# Patient Record
Sex: FEMALE | Race: WHITE | Employment: FULL TIME | URBAN - METROPOLITAN AREA
[De-identification: names, ages, dates, MRNs, and addresses within clinical notes are randomized per-mention and may not be internally consistent; named-entity substitution may affect disease eponyms.]

---

## 2019-05-10 ENCOUNTER — HOSPITAL ENCOUNTER (OUTPATIENT)
Age: 24
Discharge: HOME | End: 2019-05-10
Payer: COMMERCIAL

## 2019-05-10 LAB
A/G RATIO: 1.6 (ref 1.1–2.2)
ALBUMIN SERPL-MCNC: 4.5 G/DL (ref 3.4–5)
ALP BLD-CCNC: 52 U/L (ref 40–129)
ALT SERPL-CCNC: 19 U/L (ref 10–40)
ANION GAP SERPL CALCULATED.3IONS-SCNC: 12 MMOL/L (ref 3–16)
AST SERPL-CCNC: 18 U/L (ref 15–37)
BILIRUB SERPL-MCNC: 0.5 MG/DL (ref 0–1)
BUN BLDV-MCNC: 6 MG/DL (ref 7–20)
C-REACTIVE PROTEIN: 1.2 MG/L (ref 0–5.1)
CALCIUM SERPL-MCNC: 9.6 MG/DL (ref 8.3–10.6)
CHLORIDE BLD-SCNC: 105 MMOL/L (ref 99–110)
CHOLESTEROL, FASTING: 143 MG/DL (ref 0–199)
CO2: 25 MMOL/L (ref 21–32)
CREAT SERPL-MCNC: 0.6 MG/DL (ref 0.6–1.1)
FOLATE: 19.13 NG/ML (ref 4.78–24.2)
GFR AFRICAN AMERICAN: >60
GFR NON-AFRICAN AMERICAN: >60
GLOBULIN: 2.9 G/DL
GLUCOSE FASTING: 80 MG/DL (ref 70–99)
HCT VFR BLD CALC: 41.4 % (ref 36–48)
HDLC SERPL-MCNC: 46 MG/DL (ref 40–60)
HEMOGLOBIN: 13.9 G/DL (ref 12–16)
LDL CHOLESTEROL CALCULATED: 77 MG/DL
LITHIUM DOSE AMOUNT: NORMAL
LITHIUM LEVEL: 0.6 MMOL/L (ref 0.6–1.2)
MCH RBC QN AUTO: 29.9 PG (ref 26–34)
MCHC RBC AUTO-ENTMCNC: 33.5 G/DL (ref 31–36)
MCV RBC AUTO: 89.2 FL (ref 80–100)
PDW BLD-RTO: 13.6 % (ref 12.4–15.4)
PLATELET # BLD: 294 K/UL (ref 135–450)
PMV BLD AUTO: 8.5 FL (ref 5–10.5)
POTASSIUM SERPL-SCNC: 4.3 MMOL/L (ref 3.5–5.1)
RBC # BLD: 4.65 M/UL (ref 4–5.2)
SODIUM BLD-SCNC: 142 MMOL/L (ref 136–145)
TOTAL PROTEIN: 7.4 G/DL (ref 6.4–8.2)
TRIGLYCERIDE, FASTING: 100 MG/DL (ref 0–150)
TSH REFLEX: 1.19 UIU/ML (ref 0.27–4.2)
VALPROIC ACID LEVEL: 9.5 UG/ML (ref 50–100)
VITAMIN B-12: 944 PG/ML (ref 211–911)
VITAMIN D 25-HYDROXY: 52.6 NG/ML
VLDLC SERPL CALC-MCNC: 20 MG/DL
WBC # BLD: 4.5 K/UL (ref 4–11)

## 2019-05-10 PROCEDURE — 80061 LIPID PANEL: CPT

## 2019-05-10 PROCEDURE — 82306 VITAMIN D 25 HYDROXY: CPT

## 2019-05-10 PROCEDURE — 86140 C-REACTIVE PROTEIN: CPT

## 2019-05-10 PROCEDURE — 83036 HEMOGLOBIN GLYCOSYLATED A1C: CPT

## 2019-05-10 PROCEDURE — 82746 ASSAY OF FOLIC ACID SERUM: CPT

## 2019-05-10 PROCEDURE — 85027 COMPLETE CBC AUTOMATED: CPT

## 2019-05-10 PROCEDURE — 36415 COLL VENOUS BLD VENIPUNCTURE: CPT

## 2019-05-10 PROCEDURE — 80178 ASSAY OF LITHIUM: CPT

## 2019-05-10 PROCEDURE — 80164 ASSAY DIPROPYLACETIC ACD TOT: CPT

## 2019-05-10 PROCEDURE — 80053 COMPREHEN METABOLIC PANEL: CPT

## 2019-05-10 PROCEDURE — 84443 ASSAY THYROID STIM HORMONE: CPT

## 2019-05-10 PROCEDURE — 82607 VITAMIN B-12: CPT

## 2019-05-11 LAB
ESTIMATED AVERAGE GLUCOSE: 96.8 MG/DL
HBA1C MFR BLD: 5 %

## 2020-01-29 DIAGNOSIS — N25.1 ACQUIRED NEPHROGENIC DIABETES INSIPIDUS (HCC): ICD-10-CM

## 2020-01-29 LAB
ALBUMIN SERPL-MCNC: 4.1 G/DL (ref 3.4–5)
ANION GAP SERPL CALCULATED.3IONS-SCNC: 14 MMOL/L (ref 3–16)
BUN BLDV-MCNC: 12 MG/DL (ref 7–20)
CALCIUM SERPL-MCNC: 9.3 MG/DL (ref 8.3–10.6)
CHLORIDE BLD-SCNC: 101 MMOL/L (ref 99–110)
CO2: 25 MMOL/L (ref 21–32)
CORTISOL TOTAL: 19.8 UG/DL
CREAT SERPL-MCNC: 0.5 MG/DL (ref 0.6–1.1)
GFR AFRICAN AMERICAN: >60
GFR NON-AFRICAN AMERICAN: >60
GLUCOSE BLD-MCNC: 74 MG/DL (ref 70–99)
OSMOLALITY URINE: 664 MOSM/KG (ref 390–1070)
PHOSPHORUS: 3.8 MG/DL (ref 2.5–4.9)
POTASSIUM SERPL-SCNC: 4.6 MMOL/L (ref 3.5–5.1)
SODIUM BLD-SCNC: 140 MMOL/L (ref 136–145)
SODIUM URINE: 40 MMOL/L

## 2023-03-16 LAB
ALANINE AMINOTRANSFERASE (SGPT) (U/L) IN SER/PLAS: 13 U/L (ref 7–45)
ANION GAP IN SER/PLAS: 11 MMOL/L (ref 10–20)
CALCIUM (MG/DL) IN SER/PLAS: 9 MG/DL (ref 8.6–10.3)
CARBON DIOXIDE, TOTAL (MMOL/L) IN SER/PLAS: 29 MMOL/L (ref 21–32)
CHLORIDE (MMOL/L) IN SER/PLAS: 104 MMOL/L (ref 98–107)
CHOLESTEROL (MG/DL) IN SER/PLAS: 149 MG/DL (ref 0–199)
CHOLESTEROL IN HDL (MG/DL) IN SER/PLAS: 43.3 MG/DL
CHOLESTEROL/HDL RATIO: 3.4
CREATININE (MG/DL) IN SER/PLAS: 0.69 MG/DL (ref 0.5–1.05)
ERYTHROCYTE DISTRIBUTION WIDTH (RATIO) BY AUTOMATED COUNT: 13.4 % (ref 11.5–14.5)
ERYTHROCYTE MEAN CORPUSCULAR HEMOGLOBIN CONCENTRATION (G/DL) BY AUTOMATED: 31.9 G/DL (ref 32–36)
ERYTHROCYTE MEAN CORPUSCULAR VOLUME (FL) BY AUTOMATED COUNT: 86 FL (ref 80–100)
ERYTHROCYTES (10*6/UL) IN BLOOD BY AUTOMATED COUNT: 4.18 X10E12/L (ref 4–5.2)
GFR FEMALE: >90 ML/MIN/1.73M2
GLUCOSE (MG/DL) IN SER/PLAS: 81 MG/DL (ref 74–99)
HEMATOCRIT (%) IN BLOOD BY AUTOMATED COUNT: 36.1 % (ref 36–46)
HEMOGLOBIN (G/DL) IN BLOOD: 11.5 G/DL (ref 12–16)
HEMOGLOBIN A1C/HEMOGLOBIN TOTAL IN BLOOD: 4.7 %
LDL: 78 MG/DL (ref 0–99)
LEUKOCYTES (10*3/UL) IN BLOOD BY AUTOMATED COUNT: 5.9 X10E9/L (ref 4.4–11.3)
PLATELETS (10*3/UL) IN BLOOD AUTOMATED COUNT: 380 X10E9/L (ref 150–450)
POTASSIUM (MMOL/L) IN SER/PLAS: 4.1 MMOL/L (ref 3.5–5.3)
SODIUM (MMOL/L) IN SER/PLAS: 140 MMOL/L (ref 136–145)
THYROTROPIN (MIU/L) IN SER/PLAS BY DETECTION LIMIT <= 0.05 MIU/L: 1.5 MIU/L (ref 0.44–3.98)
TRIGLYCERIDE (MG/DL) IN SER/PLAS: 140 MG/DL (ref 0–149)
UREA NITROGEN (MG/DL) IN SER/PLAS: 11 MG/DL (ref 6–23)
VALPROIC ACID (UG/ML) IN SER/PLAS: <10 UG/ML (ref 50–100)
VLDL: 28 MG/DL (ref 0–40)

## 2023-03-17 ENCOUNTER — TELEPHONE (OUTPATIENT)
Dept: PRIMARY CARE | Facility: CLINIC | Age: 28
End: 2023-03-17
Payer: COMMERCIAL

## 2023-03-17 DIAGNOSIS — D64.9 ANEMIA, UNSPECIFIED TYPE: ICD-10-CM

## 2023-03-17 DIAGNOSIS — E03.9 HYPOTHYROIDISM, UNSPECIFIED TYPE: ICD-10-CM

## 2023-03-17 NOTE — TELEPHONE ENCOUNTER
----- Message from Smiley Amador MD sent at 3/16/2023  5:58 PM EDT -----  Notify overall looks good.  Mild anemia.  Repeat CBC with differential, ferritin, iron panel, B12, folate, TSH in the next few weeks.  No fasting needed.

## 2023-10-02 ENCOUNTER — TELEPHONE (OUTPATIENT)
Dept: PRIMARY CARE | Facility: CLINIC | Age: 28
End: 2023-10-02
Payer: COMMERCIAL

## 2023-10-02 NOTE — TELEPHONE ENCOUNTER
Pt's mom, Courtney, left a msg stating that Carli told her that she has hypothyroidism, and that there was suppose to be a referral to Endocrinology. I don't see a referral.  Is there  suppose to be one?

## 2023-10-05 ENCOUNTER — OFFICE VISIT (OUTPATIENT)
Dept: BEHAVIORAL HEALTH | Facility: CLINIC | Age: 28
End: 2023-10-05
Payer: COMMERCIAL

## 2023-10-05 VITALS
SYSTOLIC BLOOD PRESSURE: 126 MMHG | BODY MASS INDEX: 29.47 KG/M2 | DIASTOLIC BLOOD PRESSURE: 83 MMHG | HEIGHT: 69 IN | WEIGHT: 199 LBS | HEART RATE: 69 BPM

## 2023-10-05 DIAGNOSIS — F41.9 ANXIETY: ICD-10-CM

## 2023-10-05 DIAGNOSIS — F31.81 BIPOLAR 2 DISORDER (MULTI): ICD-10-CM

## 2023-10-05 PROCEDURE — 99214 OFFICE O/P EST MOD 30 MIN: CPT

## 2023-10-05 RX ORDER — GABAPENTIN 100 MG/1
100 CAPSULE ORAL 3 TIMES DAILY
COMMUNITY
End: 2023-10-07 | Stop reason: SDUPTHER

## 2023-10-05 RX ORDER — LORAZEPAM 0.5 MG/1
0.5 TABLET ORAL DAILY PRN
COMMUNITY
End: 2023-10-05 | Stop reason: SDUPTHER

## 2023-10-05 RX ORDER — LURASIDONE HYDROCHLORIDE 40 MG/1
40 TABLET, FILM COATED ORAL
COMMUNITY
End: 2023-11-02 | Stop reason: SDUPTHER

## 2023-10-05 RX ORDER — LAMOTRIGINE 25 MG/1
50 TABLET ORAL DAILY
COMMUNITY
Start: 2023-08-07 | End: 2023-11-02 | Stop reason: SDUPTHER

## 2023-10-05 RX ORDER — LAMOTRIGINE 100 MG/1
200 TABLET ORAL DAILY
COMMUNITY
Start: 2017-02-15 | End: 2023-11-02 | Stop reason: SDUPTHER

## 2023-10-05 RX ORDER — SERTRALINE HYDROCHLORIDE 100 MG/1
200 TABLET, FILM COATED ORAL DAILY
COMMUNITY
End: 2023-11-02 | Stop reason: ALTCHOICE

## 2023-10-05 RX ORDER — LORAZEPAM 0.5 MG/1
0.5 TABLET ORAL DAILY PRN
Qty: 14 TABLET | Refills: 0 | Status: SHIPPED | OUTPATIENT
Start: 2023-10-05 | End: 2023-11-02 | Stop reason: SDUPTHER

## 2023-10-05 RX ORDER — LEVOTHYROXINE SODIUM 125 UG/1
125 TABLET ORAL DAILY
COMMUNITY
Start: 2017-02-15

## 2023-10-05 RX ORDER — MIRTAZAPINE 15 MG/1
15 TABLET, FILM COATED ORAL NIGHTLY
Qty: 30 TABLET | Refills: 11 | Status: SHIPPED | OUTPATIENT
Start: 2023-10-05 | End: 2023-11-02 | Stop reason: ALTCHOICE

## 2023-10-05 NOTE — TELEPHONE ENCOUNTER
I spoke with Courtney, pt's mom, and she said she thought that Carli had told her there was an outstanding referral to Endocrinology.  I relayed Dr. Amador's msg that she needs to do the blood work that was entered in the Spring.  She said she will have Carli to it soon.
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
## 2023-10-06 ENCOUNTER — OFFICE VISIT (OUTPATIENT)
Dept: BEHAVIORAL HEALTH | Facility: CLINIC | Age: 28
End: 2023-10-06
Payer: COMMERCIAL

## 2023-10-06 DIAGNOSIS — F31.81 BIPOLAR 2 DISORDER (MULTI): ICD-10-CM

## 2023-10-06 DIAGNOSIS — F41.9 ANXIETY: ICD-10-CM

## 2023-10-06 PROCEDURE — 90837 PSYTX W PT 60 MINUTES: CPT | Performed by: PSYCHOLOGIST

## 2023-10-06 NOTE — PROGRESS NOTES
"4 PM 66219 Indiv Psych for BP II and Anxiety (60 MIN, [phone])  In-person. Continues to be anxious. Starting remeron, hoping that helps with mood and anxiety. Trying to schedule with an allergist, breaking out more, also with an endocrinologist. Wedding tomorrow. Didn't go to work yesterday, worked from home. Boss giving permission to work from homes at times if has seen patients she needs to. Continuing to work on chipping into avoidance. Didn't get to running group. Fearful at night, bad associations to when on seroquel. Trying to take \"layers/baby steps.\" Fearful of cat, Tiger (7) dying. Discussed ways to reframe this. Past week, \"restlessness.\" Briefly discussed DBT which I am not trained in. Will send her some resources. Next: 2 weeks.   "

## 2023-10-06 NOTE — TELEPHONE ENCOUNTER
I spoke again with the pt's mom, Courtney, and let her know that there was no referral to endocrinology in either the EMR or EPIC.  I spoke with the pt's pharmacy and the pt is receiving Levo 125 mcg from her psychiatrist, Dr. Cindy Gonzales.

## 2023-10-07 RX ORDER — GABAPENTIN 100 MG/1
200 CAPSULE ORAL 3 TIMES DAILY
Qty: 180 CAPSULE | Refills: 0 | Status: SHIPPED | OUTPATIENT
Start: 2023-10-07 | End: 2023-11-02 | Stop reason: SDUPTHER

## 2023-10-08 NOTE — PATIENT INSTRUCTIONS
Start Remeron 15mg at bedtime  Continue Gabapetnin, Latuda, Zoloft, lamoatrigine and ativan as prescribed   Follow up visit in 3 weeks or sooner if needed  Please call the office of message via [employer] with any questions or concerns.

## 2023-10-20 ENCOUNTER — LAB (OUTPATIENT)
Dept: LAB | Facility: LAB | Age: 28
End: 2023-10-20
Payer: COMMERCIAL

## 2023-10-20 ENCOUNTER — OFFICE VISIT (OUTPATIENT)
Dept: BEHAVIORAL HEALTH | Facility: CLINIC | Age: 28
End: 2023-10-20
Payer: COMMERCIAL

## 2023-10-20 DIAGNOSIS — E03.9 HYPOTHYROIDISM, UNSPECIFIED TYPE: ICD-10-CM

## 2023-10-20 DIAGNOSIS — F41.9 ANXIETY: ICD-10-CM

## 2023-10-20 DIAGNOSIS — D64.9 ANEMIA, UNSPECIFIED TYPE: ICD-10-CM

## 2023-10-20 DIAGNOSIS — F31.81 BIPOLAR 2 DISORDER (MULTI): ICD-10-CM

## 2023-10-20 LAB
BASOPHILS # BLD AUTO: 0.06 X10*3/UL (ref 0–0.1)
BASOPHILS NFR BLD AUTO: 1 %
EOSINOPHIL # BLD AUTO: 0.09 X10*3/UL (ref 0–0.7)
EOSINOPHIL NFR BLD AUTO: 1.5 %
ERYTHROCYTE [DISTWIDTH] IN BLOOD BY AUTOMATED COUNT: 14 % (ref 11.5–14.5)
FERRITIN SERPL-MCNC: 22 NG/ML (ref 8–150)
FOLATE SERPL-MCNC: 8.5 NG/ML
HCT VFR BLD AUTO: 38.7 % (ref 36–46)
HGB BLD-MCNC: 12.6 G/DL (ref 12–16)
IMM GRANULOCYTES # BLD AUTO: 0.02 X10*3/UL (ref 0–0.7)
IMM GRANULOCYTES NFR BLD AUTO: 0.3 % (ref 0–0.9)
IRON SATN MFR SERPL: 18 % (ref 25–45)
IRON SERPL-MCNC: 87 UG/DL (ref 35–150)
LYMPHOCYTES # BLD AUTO: 2.39 X10*3/UL (ref 1.2–4.8)
LYMPHOCYTES NFR BLD AUTO: 39 %
MCH RBC QN AUTO: 27.5 PG (ref 26–34)
MCHC RBC AUTO-ENTMCNC: 32.6 G/DL (ref 32–36)
MCV RBC AUTO: 84 FL (ref 80–100)
MONOCYTES # BLD AUTO: 0.53 X10*3/UL (ref 0.1–1)
MONOCYTES NFR BLD AUTO: 8.6 %
NEUTROPHILS # BLD AUTO: 3.04 X10*3/UL (ref 1.2–7.7)
NEUTROPHILS NFR BLD AUTO: 49.6 %
NRBC BLD-RTO: 0 /100 WBCS (ref 0–0)
PLATELET # BLD AUTO: 362 X10*3/UL (ref 150–450)
PMV BLD AUTO: 10 FL (ref 7.5–11.5)
RBC # BLD AUTO: 4.59 X10*6/UL (ref 4–5.2)
TIBC SERPL-MCNC: 488 UG/DL (ref 240–445)
TSH SERPL-ACNC: 3.65 MIU/L (ref 0.44–3.98)
UIBC SERPL-MCNC: 401 UG/DL (ref 110–370)
VIT B12 SERPL-MCNC: 366 PG/ML (ref 211–911)
WBC # BLD AUTO: 6.1 X10*3/UL (ref 4.4–11.3)

## 2023-10-20 PROCEDURE — 36415 COLL VENOUS BLD VENIPUNCTURE: CPT

## 2023-10-20 PROCEDURE — 82728 ASSAY OF FERRITIN: CPT

## 2023-10-20 PROCEDURE — 85025 COMPLETE CBC W/AUTO DIFF WBC: CPT

## 2023-10-20 PROCEDURE — 83550 IRON BINDING TEST: CPT

## 2023-10-20 PROCEDURE — 84443 ASSAY THYROID STIM HORMONE: CPT

## 2023-10-20 PROCEDURE — 83540 ASSAY OF IRON: CPT

## 2023-10-20 PROCEDURE — 82746 ASSAY OF FOLIC ACID SERUM: CPT

## 2023-10-20 PROCEDURE — 90837 PSYTX W PT 60 MINUTES: CPT | Performed by: PSYCHOLOGIST

## 2023-10-20 PROCEDURE — 82607 VITAMIN B-12: CPT

## 2023-10-20 NOTE — PROGRESS NOTES
4 PM 01979 Indiv Psych for BP II and Anxiety (60 MIN, [phone])  In-person. Overall anxiety improved but still reported consistent. Had surgery to remove some places on back, recovering, broke stitches. Hasn't been able to exercise as a result. Ate something, concerned with itching, possible allergic reaction, called 911 who came out but said to just monitor, attributing it to her anxiety. Remeron has been sedating, on the past three weeks, sleeping more (11-12 hours at night and napping). Will discuss with pharmacologist next month. Still using ativan sparingly, still daily cannabis. Said she took ativan (0.5 mg) a few times over the past week. Looking forward to getting back to exercise, likely get to [employer] more. Trip to [facility] with mom and dad Oct 26-29, visiting grandmother. Less worry about cat. Had trunk or treat event at work, enjoyed, another one at another facility next week. Hasn't gotten to running group. Feeling restless at [employer] meeting, went out and took a break. Next: 2 weeks.

## 2023-11-02 ENCOUNTER — OFFICE VISIT (OUTPATIENT)
Dept: BEHAVIORAL HEALTH | Facility: CLINIC | Age: 28
End: 2023-11-02
Payer: COMMERCIAL

## 2023-11-02 VITALS
BODY MASS INDEX: 29.47 KG/M2 | HEART RATE: 73 BPM | DIASTOLIC BLOOD PRESSURE: 94 MMHG | WEIGHT: 199 LBS | HEIGHT: 69 IN | SYSTOLIC BLOOD PRESSURE: 147 MMHG

## 2023-11-02 DIAGNOSIS — F31.30 BIPOLAR AFFECTIVE DISORDER, CURRENT EPISODE DEPRESSED, CURRENT EPISODE SEVERITY UNSPECIFIED (MULTI): ICD-10-CM

## 2023-11-02 DIAGNOSIS — F41.9 ANXIETY: ICD-10-CM

## 2023-11-02 DIAGNOSIS — F31.81 BIPOLAR 2 DISORDER (MULTI): ICD-10-CM

## 2023-11-02 DIAGNOSIS — F41.0 PANIC DISORDER WITHOUT AGORAPHOBIA WITH SEVERE PANIC ATTACKS: ICD-10-CM

## 2023-11-02 DIAGNOSIS — F12.20 CANNABIS USE DISORDER, MODERATE, DEPENDENCE (MULTI): ICD-10-CM

## 2023-11-02 DIAGNOSIS — F32.A DEPRESSION, UNSPECIFIED DEPRESSION TYPE: ICD-10-CM

## 2023-11-02 PROCEDURE — 99215 OFFICE O/P EST HI 40 MIN: CPT

## 2023-11-02 RX ORDER — LAMOTRIGINE 25 MG/1
50 TABLET ORAL DAILY
Qty: 60 TABLET | Refills: 1 | Status: SHIPPED | OUTPATIENT
Start: 2023-11-02 | End: 2023-12-22 | Stop reason: SDUPTHER

## 2023-11-02 RX ORDER — LAMOTRIGINE 100 MG/1
200 TABLET ORAL DAILY
Qty: 60 TABLET | Refills: 1 | Status: SHIPPED | OUTPATIENT
Start: 2023-11-02 | End: 2023-12-22 | Stop reason: SDUPTHER

## 2023-11-02 RX ORDER — GABAPENTIN 100 MG/1
200 CAPSULE ORAL 3 TIMES DAILY
Qty: 180 CAPSULE | Refills: 1 | Status: SHIPPED | OUTPATIENT
Start: 2023-11-02 | End: 2023-12-01 | Stop reason: ALTCHOICE

## 2023-11-02 RX ORDER — DULOXETIN HYDROCHLORIDE 20 MG/1
20 CAPSULE, DELAYED RELEASE ORAL SEE ADMIN INSTRUCTIONS
Qty: 60 CAPSULE | Refills: 1 | Status: SHIPPED | OUTPATIENT
Start: 2023-11-02 | End: 2023-12-22 | Stop reason: SDUPTHER

## 2023-11-02 RX ORDER — LORAZEPAM 0.5 MG/1
0.5 TABLET ORAL 2 TIMES DAILY PRN
Qty: 60 TABLET | Refills: 0 | Status: SHIPPED | OUTPATIENT
Start: 2023-11-02 | End: 2023-12-01 | Stop reason: SDUPTHER

## 2023-11-02 RX ORDER — LURASIDONE HYDROCHLORIDE 40 MG/1
40 TABLET, FILM COATED ORAL
Qty: 30 TABLET | Refills: 1 | Status: SHIPPED | OUTPATIENT
Start: 2023-11-02 | End: 2023-12-01 | Stop reason: ALTCHOICE
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
## 2023-11-03 ENCOUNTER — OFFICE VISIT (OUTPATIENT)
Dept: BEHAVIORAL HEALTH | Facility: CLINIC | Age: 28
End: 2023-11-03
Payer: COMMERCIAL

## 2023-11-03 DIAGNOSIS — F41.9 ANXIETY: ICD-10-CM

## 2023-11-03 DIAGNOSIS — F31.81: ICD-10-CM

## 2023-11-03 PROCEDURE — 90837 PSYTX W PT 60 MINUTES: CPT | Performed by: PSYCHOLOGIST

## 2023-11-03 NOTE — PATIENT INSTRUCTIONS
STOP Remeron   Continue Latuda, Lamotrgine and gabapentin without change   Taper off Zoloft and onto Duloxetine - instructions emailed to you directly but please ask if you have questions   Increase ativan in the short term to 0.5mg twice per day as needed for anxiety  Follow up visit in 3 weeks or sooner if needed  Please call the office or message via [phone] with any questions

## 2023-11-03 NOTE — PROGRESS NOTES
"4 PM 35219 Indiv Psych for BP II, Depressed and Anxiety (60 MIN, [phone])  In-person. Supportive/CBT Therapy. More depressed over past few weeks, low energy, \"no desire to walk\". Cut one day (hip) with . Unclear precipitant, biological, less misha days, med changes (now stopping mirtazapine, will start duloxetine in 2 weeks, will titrate off sertraline over a month, on new bc pill/hormonal). 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 Has decided to back off of [employer] activity for a few weeks, may only go to part of Nov 17 retreat at [facility]. Working from home some. Wants to focus on behavioral activation and plans on making list of DBT skills to try to adhere to. REECE, an OB in training, will be coming for several weeks from [place] to stay with her. Went to [facility] a few weeks ago and enjoyed.  \"I don't want to leave home. Lost interest in everything...Non-functional.\" Identified some activities she wants to prioritize (walk, blinds for spare bedroom for when REECE comes, oil change, groceries--may have delivered). Is concerned there aren't a lot of good med interventions left. Signed up for ketamine tx's if meds don't help. Next: 2 weeks.   "

## 2023-11-07 ENCOUNTER — DOCUMENTATION (OUTPATIENT)
Dept: BEHAVIORAL HEALTH | Facility: CLINIC | Age: 28
End: 2023-11-07
Payer: COMMERCIAL

## 2023-11-07 NOTE — PROGRESS NOTES
"Received call back request from patient. Last visit on 11/2/23.  Outreach attempted at 5:03pm - patient reports feeling \"not great\".   Since last visit patient has reduced Zoloft dose to 150mg daily - worsening mood, appetite, and motivation reported. She communicates: \"Gotten significantly worse\".   She is tearful as she describes not wanting to stop plan to taper off Zoloft but also does recognize a significant negative change in her symptoms over the past few days since adjusting. Ultimately we decided together to maintain Zoloft at 150mg and check in 24-48 hours before finalizing plan which patient is agreeable to. Previously discussed DBT based IOP at [facility] which patient agrees to look into - phone number provided. Patients sister lives nearby and is part of her support system. Patient reports feeling safe at this time. + passive SI or thoughts of Death. No Plan or intent. No SIB. Patient agrees to call 911 or go to the ER if SI w/ plan or intent is recognized.     "

## 2023-11-08 ENCOUNTER — APPOINTMENT (OUTPATIENT)
Dept: ALLERGY | Facility: CLINIC | Age: 28
End: 2023-11-08
Payer: COMMERCIAL

## 2023-11-09 ENCOUNTER — DOCUMENTATION (OUTPATIENT)
Dept: BEHAVIORAL HEALTH | Facility: CLINIC | Age: 28
End: 2023-11-09
Payer: COMMERCIAL

## 2023-11-09 NOTE — PROGRESS NOTES
"Outreach attempted at 5:21pm   Spoke to patient - anxiety and mood symptoms remain present but improving: \"difficult but manageable\". She has signed up from [facility] starting next week. Will complete intake with CNP x1 with tentative plan to continue SSRI taper.   "

## 2023-11-10 ENCOUNTER — OFFICE VISIT (OUTPATIENT)
Dept: BEHAVIORAL HEALTH | Facility: CLINIC | Age: 28
End: 2023-11-10
Payer: COMMERCIAL

## 2023-11-10 DIAGNOSIS — F31.81: ICD-10-CM

## 2023-11-10 PROCEDURE — 90837 PSYTX W PT 60 MINUTES: CPT | Performed by: PSYCHOLOGIST

## 2023-11-11 NOTE — PROGRESS NOTES
5 PM 37069 Indiv Psych for BP II and Anxiety (60 MIN, [phone])  In-person. Supportive/CBT Therapy. Continues to be distressed, depressed. Teary during meeting. Believes some of distress may be secondary to sertraline taper/withdrawal. However, was feeling more depressed prior to taper. She's signed up for  General night IOP program, M, Wed, Thur evenings. So will plan to still do work then go there. Will make for long days but has more difficulty managing at night. Reported no new incidents of self-harm but did report she's felt like it, noted cannabis use has helped, also builds in delay. Still taking ativan. 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 Bothered that head of youth ministry didn't inquire about how she was doing or taking off, speculated perhaps he's trying to respect her privacy. Asked REECE re. Possibly hormonal influence on current mood state and may do ovulation strips to assess hormone levels. Has been feeling more nauseous, not like eating and has to push self. Sees restriction as possible means of self-harm. Said she wanted to lose weight. Discussed importance of keeping energy/calories in system, to help reduce distress. Said she knows she can get by with around 900 mike/day. Asked best friend who is a GI PA about GI distress. I also encouraged her to consider having a medical workup. Trying to get outside, gets some walks in. REECE coming tomorrow but will stay with parents. She's invited to go there for dinner. Family will come see her tomorrow. She has to work early in AM for second job. Has a rough plan for this evening. Will video chat with a friend who recently had a baby, also may talk to cousin. Body hurts, may get a shower. 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 Did get another office at one facility, likes [facility] mate. Agreed to meet in a week to check in. Patient continues to be concerned what she'll do if med options run out. Season, darker outside, may also be  influence to increase in depression. Doesn't have light box, may get one. Next: 1 week.

## 2023-11-13 ENCOUNTER — TELEPHONE (OUTPATIENT)
Dept: PRIMARY CARE | Facility: CLINIC | Age: 28
End: 2023-11-13
Payer: COMMERCIAL

## 2023-11-13 NOTE — TELEPHONE ENCOUNTER
Pt's mom, Courtney, left a msg stating that the pt had thyroid blood work done and was looking for the results.  She wanted you to know that she had been off her meds for a week prior to the blood draw.  She will need a refill of the medication with changes if any.

## 2023-11-17 ENCOUNTER — TELEMEDICINE (OUTPATIENT)
Dept: BEHAVIORAL HEALTH | Facility: CLINIC | Age: 28
End: 2023-11-17
Payer: COMMERCIAL

## 2023-11-17 DIAGNOSIS — F31.81: ICD-10-CM

## 2023-11-17 PROCEDURE — 90837 PSYTX W PT 60 MINUTES: CPT | Performed by: PSYCHOLOGIST

## 2023-11-17 NOTE — PROGRESS NOTES
"4 PM 93098 Indiv Psych for BP II and Anxiety (60 MIN, [id number])  In-person. Supportive Therapy. Started IOP at [facility], three times this week. Difficult first day, found discussion on setting boundaries useful. 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 Still crying a lot at work/home, going home early from work and working rest of day from home. Hopeless ideation, no suicide plans, just feeling hopeless, concerned about running out of treatment options. Wants to discuss with pharmacologist coming off everything except duloxetine. Has already discontinued gabapentin. She mentioned her uncle, who's a psychiatrist, said a \"CA rocket fuel\" of mirtazapine then venlafaxine might help. Will discuss with pharmacologist. Reported that cannabis used routinely brings down distress level, from 9 to a 5. Still using ativan 2 times/day. Not exercising routinely, did go out and run 3.5 miles the other day, felt good. Discussed whether or not doing less more consistently would be better than more (3.5 mi) less often. Not eating a lot, low appetite, lost 5 pounds in past few weeks. Sleep problems, using melatonin now. Busy weekend, cousin's birthday celebration at [employer], dinner, Thanksgiving coming up. Will continue with IOP, may only get there one day next week. Next: 3 weeks.     "

## 2023-11-21 DIAGNOSIS — E03.9 HYPOTHYROIDISM, UNSPECIFIED TYPE: Primary | ICD-10-CM

## 2023-11-22 ENCOUNTER — TELEMEDICINE (OUTPATIENT)
Dept: BEHAVIORAL HEALTH | Facility: CLINIC | Age: 28
End: 2023-11-22
Payer: COMMERCIAL

## 2023-11-22 DIAGNOSIS — F41.0 PANIC DISORDER WITHOUT AGORAPHOBIA WITH SEVERE PANIC ATTACKS: ICD-10-CM

## 2023-11-22 DIAGNOSIS — F12.20 CANNABIS USE DISORDER, MODERATE, DEPENDENCE (MULTI): ICD-10-CM

## 2023-11-22 DIAGNOSIS — F41.9 ANXIETY: ICD-10-CM

## 2023-11-22 DIAGNOSIS — F31.30 BIPOLAR AFFECTIVE DISORDER, CURRENT EPISODE DEPRESSED, CURRENT EPISODE SEVERITY UNSPECIFIED (MULTI): ICD-10-CM

## 2023-11-22 PROCEDURE — 99215 OFFICE O/P EST HI 40 MIN: CPT

## 2023-11-22 RX ORDER — LURASIDONE HYDROCHLORIDE 60 MG/1
60 TABLET, FILM COATED ORAL DAILY
Qty: 30 TABLET | Refills: 0 | Status: SHIPPED | OUTPATIENT
Start: 2023-11-22 | End: 2023-12-01 | Stop reason: SDUPTHER

## 2023-11-22 RX ORDER — DOXYCYCLINE 100 MG/1
CAPSULE ORAL
COMMUNITY
Start: 2023-11-15

## 2023-11-22 RX ORDER — MUPIROCIN 20 MG/G
OINTMENT TOPICAL
COMMUNITY
Start: 2023-11-16

## 2023-11-22 RX ORDER — TRIAMCINOLONE ACETONIDE 1 MG/G
CREAM TOPICAL
COMMUNITY
Start: 2023-06-30

## 2023-11-22 RX ORDER — CLINDAMYCIN PHOSPHATE 10 MG/ML
SOLUTION TOPICAL 2 TIMES DAILY
COMMUNITY
Start: 2023-11-15

## 2023-11-22 NOTE — PROGRESS NOTES
"Subjective   Patient ID: Carli Wang is a 28 y.o. female who presents for Follow-up Last visit with this writer on 11/2/23     HPI  Patient has entered [facility] which is going well -\"I enjoy it\". Patient is tearful and distraught on camera as she reports intensifying symptoms of depression and persistent symptoms of anxiety. During previous visits anxiety and atypical panic attacks presented as priority concern but today patient communicates depression represents her priority of treatment. She communicates \"I feel worse and worse, pretty badly every day\". Mood is depressed with daily crying spells, anhedonia, passive SI, thoughts of SIB - functioning is significantly impacted as patient reports often working from home and leaving early d/t depressive symptoms. Discussed stopping Zoloft taper - patient feels mood change started prior to adjustment in Zoloft dosing and wishes to continue taper. Brain zaps reported during first week of taper but no withdrawal symptoms reported now - taking duloxetine for 1 week at this time, stopped Zoloft today. Discussed timeline of mood change and patient describes a noticeable change in her mood 6-8 weeks ago - no new stressors or events occurring then that patient can connect with mood change.     -- Depression --   Mood: \"hopeless\", \"not functional\"  + anhedonia    Daily snacking - no consistent meals   + 6 lbs weight loss over the past 3 weeks   + nausea   Sleep: inadequate, + restless sleep   - no nightmares  + nighttime panic attacks   Energy levels: + fatigue   Focus/concentration: impaired  Hopelessness: endorses     Patient reports thoughts of SIB but has avoided consistently since last visit.   + passive SI reported, no plan or intent. Patient is confident she can continue to keep herself safe and is accepting of safety resources. Discussed voluntary admission - patient differs and does not meet criteria for involuntary admission. Patient does agree to " "continue relying on her support system and agrees to call 911 or reporting to the ER if SI w/ a plan or intent was recognized.     Bipolar ROS   Patient does report ongoing restlessness - discussed risk of akathisia which after review of symptom frequency and timeline does not appear applicable.   1. Inflated self-esteem or grandiosity - denies   2. Decreased need for sleep - Denies   3. More talkative than usual or pressure to keep talking - denies   4. Flight of ideas or subjective experience that thoughts are racing - endorses   5. Distractibility - denies   6. Increase in goal-directed activity - denies   7. Excessive involvement in activities that have a high potential for  painful consequences - denies     AVH - no   Fear/paranoia - no   No delusional thinking noted  Does not appear internally stimulated     - anxiety -   Panic symptoms - \"100% of the time\", \"sense of panic and hopelessness\"   Restlessness/keyed up or on edge: restlessness   Easily fatigued: endorses   Concentration/Focus: endorses   Irritability: endorses   Muscle tension: \"everything\", + GI upset. \"Body aches\"   Sleep disturbance: endorses   + panic symptoms but no discrete panic attacks noted     OCD  + intrusive thoughts \"all the time\" r/t death (not purely SI)   No checking behaviors, previously reported  Improving intrusive thoughts related to safety at home.     No skin picking/hair pulling     PTSD   - \"I don't know why I have a PTSD diagnosis\"   - \"slightly traumatic relationship issue\" before being admitted   - sometimes thinking about now   - No flashbacks/hypervigilance   + hx of PTSD diagnosed by former provider     Substance use   Alcohol: No alcohol use since last visit.   Denies tobacco use   Caffeine: (1-2 cups of coffee per day)   Marijuana:  (Smoking legal MJ delta 8 - \"pretty consistently since age 19\". Daily use   No illicit drug use reported   No hx of treatment for JESSICA     Safety:   Patient is moderate acute and moderate " "chronic risk of suicide. Static risk factors include female gender,  race and age 27. Dynamic risk factors include above psychiatric symptoms which we are addressing with medication. Protective factors include no previous attempts, rational thinking intact, good social support, help seeking, future oriented and no guns at home.    Objective   General Appearance: Well groomed, appropriate eye contact  Attitude/Behavior: Cooperative  Motor: No psychomotor agitation or retardation, no tremor or other abnormal movements  Speech: Normal rate, volume, prosody  Gait/Station: WFL - Within functional limits  Mood: \"hopeless\", \"not functional\"  Affect: Dysphoric, constricted but reactive, Sad/Tearful, Anxious  Thought Process: Linear, goal directed  Thought Associations: No loosening of associations  Thought Content: Normal  Perception: No perceptual abnormalities noted  Sensorium: Alert and oriented to person, place, time and situation  Insight: Fair  Judgement: Fair  Cognition: Cognitively intact to conversational testing with respect to attention, orientation, fund of knowledge, recent and remote memory, and language    Lab Review:   not applicable    Assessment/Plan   Problem List Items Addressed This Visit             ICD-10-CM    Anxiety F41.9    Bipolar affective disorder, current episode depressed (CMS/[facility]) F31.30    Relevant Medications    lurasidone (Latuda) 60 mg tablet    Cannabis use disorder, moderate, dependence (CMS/HCC) F12.20    Panic disorder without agoraphobia with severe panic attacks F41.0   Provider impression/Recommendations  Carli Wang is a 27 year old female currently residing in [place], [place] with herself. She is employed full-time as a dietician traveling to local nursing homes. She is being seen as a referral from Dr. Magdaleno Starks at  - last/initial appointment on 2/27/23. Pphx: Bipolar type 2, OCD, anxiety w/ panic attacks, PTSD, Trichotillomania. Sought with multiple " different providers in [facility] starting in childhood. Previously treated by Dr. Mao in [place] for the past 6 years.       h/o OCD  h/o PTSD   H/o Trichotillomania   bipolar and related disorder, unspecified   Panic disorder    ( R/o cluster B personality traits)   cannabis use d/o     current medications   gabapentin 200mg TID - unsure if helpful (patient self discontinued)   lorazepam 0.5mg BID - taking BID daily   Reviewed OARRS on 11/22/23 - Ativan last filled on 11/2/23 for 60 tablets  Drug screen completed on 4/13/23 (during ER visit), + THC   Latuda 40mg daily   lamotrigine 250mg daily  Duloxetine 20mg daily    - Mood is depressed/anxious with increasing anxiety and depressive symptoms   - no new stressors or specific stressors reported that appear to impact current presentation  - Functioning continues to decline with passive SI, intrusive thoughts related to death and thoughts of SIB.   - This writer is concerned regarding patients safety and overall presentation. Patient denies voluntary admission and does not meet criteria for involuntary admission   - Provided safety resources and gained collateral from patients sister who lives nearby   - Patient does report Ativan is one of few agents that has consistently benefited her anxiety  - Patient does report using MJ products but given her recent decline in functioning will continue benzodiazepine at current dose.   - increase Latuda dose to 60mg daily (discussed importance of taking with food)    - c/w LTG 250mg daily   - c/w Duloxetine 20mg daily   - c/w Ativan 0.5mg BID   - encouraged patient to continue in IOP at [facility]   - Patient wishes to consider Ketamine/TMS, referral placed for patient to see Dr. Blackwell as second opinion and to explore TMS/Ketamine. Case discussed and confirmed with Dr. Blackwell    - Continue Duloxetine per pre-set taper. Discussed Zyprexa as option to consider if no benefits noted at higher dose of Latuda.   - FU visit in 1 week or  "sooner if needed  - reviewed medication supply with patient and placed refills as needed.       Past Medication Trials:   \"trialed every single anti-depressants but I don't have a list\"   - including MAOIs  Lithium - last taken 3-4 years ago. + DM insipidus   Risperdal: ineffective, + panic attacks.   \"a patch\" w/ Dr. Mao ([facility]?)   Depakote - stopped in December, + weight gain, appetite has improved since.   Latuda - stopped d/t possible akathisia, weight loss   Seroquel - stopped during IOP   Abilify - unsure of response   Hydroxyzine - not helpful   clonidine - not helpful   BuSpar - \"tried that like three different times\"   Clonidine - not helpful   Propranolol - dizziness  Wellbutrin - short term trial   Remeron - stopped d/t worsening mood     Start time 1:06pm   End time 1:45pm   Prep/charting time: 10min   Total time min 49min     Collateral from sister Jess [phone]  Sister agrees to visit with patient and monitor safety - lives nearby   Discussed concerns related to patients worsening presentation and symptoms or safety concerns that may warrant higher level of care which patients sister is understanding of   "

## 2023-11-24 NOTE — PATIENT INSTRUCTIONS
Continue Ativan 0.5mg - twice per day  Increase Latuda dose to 60mg daily - take with food   Continue Lamotrigine and Duloxetine as prescribed  Continue in [facility]   Follow up visit in 1 week or sooner if needed

## 2023-12-01 ENCOUNTER — TELEMEDICINE (OUTPATIENT)
Dept: BEHAVIORAL HEALTH | Facility: CLINIC | Age: 28
End: 2023-12-01
Payer: COMMERCIAL

## 2023-12-01 DIAGNOSIS — F31.30 BIPOLAR AFFECTIVE DISORDER, CURRENT EPISODE DEPRESSED, CURRENT EPISODE SEVERITY UNSPECIFIED (MULTI): ICD-10-CM

## 2023-12-01 DIAGNOSIS — F31.81 BIPOLAR 2 DISORDER (MULTI): ICD-10-CM

## 2023-12-01 DIAGNOSIS — F41.0 PANIC DISORDER WITHOUT AGORAPHOBIA WITH SEVERE PANIC ATTACKS: ICD-10-CM

## 2023-12-01 DIAGNOSIS — F32.A DEPRESSION, UNSPECIFIED DEPRESSION TYPE: ICD-10-CM

## 2023-12-01 DIAGNOSIS — F41.9 ANXIETY: ICD-10-CM

## 2023-12-01 DIAGNOSIS — F12.20 CANNABIS USE DISORDER, MODERATE, DEPENDENCE (MULTI): ICD-10-CM

## 2023-12-01 PROCEDURE — 99215 OFFICE O/P EST HI 40 MIN: CPT

## 2023-12-01 RX ORDER — LURASIDONE HYDROCHLORIDE 80 MG/1
80 TABLET, FILM COATED ORAL DAILY
Qty: 30 TABLET | Refills: 0 | Status: SHIPPED | OUTPATIENT
Start: 2023-12-01 | End: 2023-12-22 | Stop reason: SDUPTHER

## 2023-12-01 RX ORDER — LORAZEPAM 1 MG/1
1 TABLET ORAL 2 TIMES DAILY PRN
Qty: 60 TABLET | Refills: 0 | Status: SHIPPED | OUTPATIENT
Start: 2023-12-01 | End: 2023-12-22 | Stop reason: SDUPTHER

## 2023-12-01 NOTE — PROGRESS NOTES
"Subjective   Patient ID: Carli Wang is a 28 y.o. female who presents for Follow-up and Med Management Last visit with this writer on 11/22/23     HPI  When asked how she is feeling since last visit patient reports - \"Just as bad if not maybe a little worse\", \"A couple major meltdowns\". No triggers or specific stressors reported that impact mood/anxiety/meltdowns. Patients mother and sister are visiting with her at her home and providing support as needed. Patient differs on staying with family members. Patient reports functioning is severely limited - \"I don't cook anymore, my family has to like provide meals\", \"The thought of microwaving something seem exhausting, overwhelming\". Decreasing work hours and working from home more at this time. No change in restlessness/anxiety with Latuda dose increase - No consistent restlessness/difficulty sitting still reported. Patient appears calm, fatigued and dysphoric on camera. Patient continues in [facility] - \"its good\", \"missing days\" d/t \"feeling too bad\". No specific bothersome topics reported - \"a lot of the benefit is just being forced to be social\". Decreasing MJ usage - \"tired of using it\". Discussed how MJ use could impact appetite. Patient agrees to continue reducing use and monitor appetite closely.    -- Depression --   Mood: \"hopeless\", depressed   + anhedonia    Daily snacking - no consistent meals   + nausea and non purposeful weight loss   Sleep: improving, adequate.   - no nightmares  Energy levels: + fatigue   Focus/concentration: impaired  Hopelessness: endorses   + thoughts of SIB.   + passive SI no plan or intent.     Patient is confident she can continue to keep herself safe and is accepting of safety resources. Discussed voluntary admission - patient differs and does not meet criteria for involuntary admission. Patient does agree to continue relying on her support system and agrees to call 911 or report to the ER if SI w/ a plan or " "intent was recognized. Discussed intake process at [facility] vs [facility].     Bipolar ROS   No john/hypomania since last visit   + hx of BPII which patient may disagree with     [facility] - no   Fear/paranoia - no   No delusional thinking noted  Does not appear internally stimulated     - anxiety -   Panic symptoms - \"100% of the time\", \"sense of panic and hopelessness\"   Restlessness/keyed up or on edge: endorses   Easily fatigued: endorses   Concentration/Focus: endorses   Irritability: endorses   Muscle tension: \"everything\", + GI upset. \"Body aches\"   Sleep disturbance: endorses   + panic attacks    OCD  + intrusive thoughts \"all the time\" r/t death (not purely SI)   No checking behaviors, previously reported  Improving intrusive thoughts related to safety at home.     No skin picking/hair pulling     PTSD   - \"I don't know why I have a PTSD diagnosis\"   - \"slightly traumatic relationship issue\" before being admitted   - sometimes thinking about now   - No flashbacks/hypervigilance   + hx of PTSD diagnosed by former provider     Substance use   Alcohol: No alcohol use since last visit.   Denies tobacco use   Caffeine: (1-2 cups of coffee per day)   Marijuana: daily use, reducing (delta 8 products)   No illicit drug use reported   No hx of treatment for JESSICA     Safety:   Patient is moderate acute and moderate chronic risk of suicide. Static risk factors include female gender,  race and age 27. Dynamic risk factors include above psychiatric symptoms which we are addressing with medication. Protective factors include no previous attempts, rational thinking intact, good social support, help seeking, future oriented and no guns at home.    Objective   General Appearance: Fairly groomed  Attitude/Behavior: Cooperative, Fair eye contact  Motor: No psychomotor agitation or retardation, no tremor or other abnormal movements  Speech: Normal rate, volume, prosody  Gait/Station: WFL - Within functional " "limits  Mood: depressed, \"hopeless\"  Affect: Dysphoric, constricted but reactive, Sad/Tearful  Thought Process: Linear, goal directed  Thought Associations: No loosening of associations  Thought Content:  (see HPI)  Perception: No perceptual abnormalities noted  Sensorium: Alert and oriented to person, place, time and situation  Insight: Intact  Judgement: Intact  Cognition: Cognitively intact to conversational testing with respect to attention, orientation, fund of knowledge, recent and remote memory, and language    Lab Review:   not applicable    Assessment/Plan   Problem List Items Addressed This Visit             ICD-10-CM    Anxiety F41.9    Relevant Medications    LORazepam (Ativan) 1 mg tablet    Bipolar affective disorder, current episode depressed (CMS/[facility]) F31.30    Relevant Medications    lurasidone (Latuda) 80 mg tablet    Depression F32.A    Cannabis use disorder, moderate, dependence (CMS/[facility]) F12.20    Panic disorder without agoraphobia with severe panic attacks F41.0     Other Visit Diagnoses         Codes    Bipolar 2 disorder (CMS/[facility])     F31.81    Relevant Medications    LORazepam (Ativan) 1 mg tablet          Provider impression/Recommendations  Carli Wang is a 27 year old female currently residing in [place], [place] with herself. She is employed full-time as a dietician traveling to local nursing homes. She is being seen as a referral from Dr. Magdaleno Starks at  - last/initial appointment on 2/27/23. Pphx: Bipolar type 2, OCD, anxiety w/ panic attacks, PTSD, Trichotillomania. Sought with multiple different providers in [facility] starting in childhood. Previously treated by Dr. Mao in [place] for the past 6 years.       h/o OCD  h/o PTSD   H/o Trichotillomania   bipolar and related disorder, unspecified   Panic disorder    ( R/o cluster B personality traits)   cannabis use d/o     current medications   lorazepam 0.5mg TID    Reviewed OARRS on 12/1/23 - Ativan last filled on 11/2/23 " "for 60 tablets  Drug screen completed on 4/13/23 (during ER visit), + THC   Latuda 60mg daily   lamotrigine 250mg daily  Duloxetine 20mg daily, taking daily w/o Zoloft for 1 week     - Mood is depressed with consistent passive SI, hopelessness, decreased appetite, fatigue, impaired concentration and anhedonia.   - No improvements noted since last visit and functioning remains significantly impaired   - no new stressors or specific stressors reported that appear to impact current presentation  - PRN ativan helpful to maintain at work and get through the day  - Scheduled visit with Dr. Blackwell to discuss ECT/Ketamine/TMS as treatment options   - Encouraged patient to schedule visit with prescriber associated with IOP program to gain second opinion given lack of improvements and consistent decline in functioning   - Increase Ativan from .5mg TID to 1mg BID. No side effects reported with   - Patient agrees to continue reducing MJ use.    - c/w LTG 250mg daily   - c/w Duloxetine 20mg daily (taking daily for 1 week)   - Increase Latuda dose to 80mg daily. No side effects reported with recent dose increase.   - encouraged patient to continue in IOP at [facility]   - FU visit in 1 week or sooner if needed  - reviewed medication supply with patient and placed refills as needed.   - patient is aware this provider is leaving  March 2024    Past Medication Trials:   \"trialed every single anti-depressants but I don't have a list\"   - including MAOIs  Lithium - last taken 3-4 years ago. + DM insipidus   Risperdal: ineffective, + panic attacks.   \"a patch\" w/ Dr. Mao ([facility]?)   Depakote - stopped in December, + weight gain, appetite has improved since.   Latuda - stopped d/t possible akathisia, weight loss   Seroquel - stopped during IOP   Abilify - unsure of response   Hydroxyzine - not helpful   clonidine - not helpful   BuSpar - \"tried that like three different times\"   Clonidine - not helpful   Propranolol - " dizziness  Wellbutrin - short term trial   Remeron - stopped d/t worsening mood     Start time 3:03pm  End time 3:33pm  Prep/charting time: 10min   Total time min 40min     sister Jess [phone]. Okay to call if needed per patient

## 2023-12-02 ENCOUNTER — OFFICE VISIT (OUTPATIENT)
Dept: BEHAVIORAL HEALTH | Facility: CLINIC | Age: 28
End: 2023-12-02
Payer: COMMERCIAL

## 2023-12-02 DIAGNOSIS — F41.9 ANXIETY: ICD-10-CM

## 2023-12-02 DIAGNOSIS — F31.81 BIPOLAR 2 DISORDER (MULTI): ICD-10-CM

## 2023-12-02 PROCEDURE — 90837 PSYTX W PT 60 MINUTES: CPT | Performed by: PSYCHOLOGIST

## 2023-12-03 NOTE — PROGRESS NOTES
1 PM 18924 Indiv Psych for BP II and Anxiety (60 MIN, 103-158)  In-person. Supportive Therapy. Reached out to meet earlier than next scheduled appt. Saw pharmacologist yesterday or day before. Ativan was increased to 1 mgX2/day. Started increase today, feeling better today, more functional. Prior to that, continued to described increased anxiety and depression and pressure on chest, with body pain and headache. We discussed how this might be construed as mixed state like symptoms. Sees Dr. Blackwell for consult on Dec 19, regarding potential efficacy of TMS/ketamine. This past week, not feeling safe, felt like wanted to die, some scratches with tack on body one time. Sister and parents checking in on her more. Has decided to keep uncle, a psychiatrist, out of her treatment. Discussing her lining up new psychiatrist when current provider leaves in March, 2024. Made some recommendations to her. Not exercising routinely, avoidant, staying in more. Did get to gym once, not walking routinely. Getting to work for several hours then goes and works from home. Still going to [employer], DBT-based, keeping daily schedule, trying to apply skills. Going today to [facility] with REECE and sister. Neatly dressed today, including makeup. Trying to work to set limits with others. Cut back second job for next few months. Some anxiety about full day training near [place], next week. Next: 1 week.

## 2023-12-03 NOTE — PATIENT INSTRUCTIONS
Increase Latuda dose to 80mg daily with food  Increase Ativan from 0.5mg three times per day as needed to 1mg twice per day   Continue Lamotrigine and duloxetine as prescribed   Follow up visit in 1 week or sooner if needed  Please schedule visit with IOP prescriber

## 2023-12-08 ENCOUNTER — TELEMEDICINE (OUTPATIENT)
Dept: BEHAVIORAL HEALTH | Facility: CLINIC | Age: 28
End: 2023-12-08
Payer: COMMERCIAL

## 2023-12-08 ENCOUNTER — OFFICE VISIT (OUTPATIENT)
Dept: BEHAVIORAL HEALTH | Facility: CLINIC | Age: 28
End: 2023-12-08
Payer: COMMERCIAL

## 2023-12-08 DIAGNOSIS — F41.0 PANIC DISORDER WITHOUT AGORAPHOBIA WITH SEVERE PANIC ATTACKS: ICD-10-CM

## 2023-12-08 DIAGNOSIS — F31.30 BIPOLAR AFFECTIVE DISORDER, CURRENT EPISODE DEPRESSED, CURRENT EPISODE SEVERITY UNSPECIFIED (MULTI): ICD-10-CM

## 2023-12-08 DIAGNOSIS — F12.20 CANNABIS USE DISORDER, MODERATE, DEPENDENCE (MULTI): ICD-10-CM

## 2023-12-08 DIAGNOSIS — F41.9 ANXIETY: ICD-10-CM

## 2023-12-08 DIAGNOSIS — F32.A DEPRESSION, UNSPECIFIED DEPRESSION TYPE: ICD-10-CM

## 2023-12-08 DIAGNOSIS — F31.81 BIPOLAR 2 DISORDER (MULTI): ICD-10-CM

## 2023-12-08 PROCEDURE — 99213 OFFICE O/P EST LOW 20 MIN: CPT

## 2023-12-08 PROCEDURE — 90837 PSYTX W PT 60 MINUTES: CPT | Performed by: PSYCHOLOGIST

## 2023-12-08 NOTE — PROGRESS NOTES
"Subjective   Patient ID: Carli Wang is a 28 y.o. female who presents for Anxiety, Depression, and Med Management Last visit with this writer on 12/1/23.     HPI  Patient continues in IOP at [facility] which is going well - seeing IOP prescriber next week and Dr. Blackwell the following week to discuss TMS/ECT/Ketamine. Mood and anxiety symptoms improved - \"physical symptoms of anxiety have greatly decreased\", crying spells occurring less often, \"Able to do more stuff\". Positive response with ativan 1mg BID. No sedation/fatigue/lack of coordination reported but patient does feel memory may be slightly impaired. Tolerating higher dose of Lautda (taking nightly with food). No EPS/TD or akathisia reported. Working a mixture of in-person and WFH when mood is down - had work retreat this week - \"it was good\". Reducing Mj use - \"not relying on it as much\", reduced by 30-50%.     -- Depression --   Mood: depressed   + partial Anhedonia, improved   Daily snacking - no consistent meals   Subtly improving appetite   Sleep: 10-12 hours/night   - no nightmares  - sleeping in bed which is a positive change per patient   Energy levels: + fatigue   Focus/concentration: focused during visit, impaired per patient   Hopelessness: endorses   + thoughts of SIB. No SIB the past week, \"huge good thing\"   + passive SI, no plan or intent.     Patient is confident she can continue to keep herself safe and is accepting of safety resources. Discussed voluntary admission - patient differs and does not meet criteria for involuntary admission. Patient does agree to continue relying on her support system and agrees to call 911 or report to the ER if SI w/ a plan or intent was recognized. Discussed intake process at [facility] vs [facility].     Bipolar ROS   No john/hypomania since last visit   + hx of BPII which patient may disagree with     OCD  + intrusive thoughts \"all the time\" r/t death (not purely SI)   No checking behaviors, " previously reported  Improving intrusive thoughts related to safety at home.   No skin picking/hair pulling     Substance use   Alcohol: No alcohol use since last visit.   Denies tobacco use   Caffeine: (1-2 cups of coffee per day)   Marijuana: daily use, reducing (delta 8 products)   No illicit drug use reported   No hx of treatment for JESSICA     Safety:   Patient is moderate acute and moderate chronic risk of suicide. Static risk factors include female gender,  race and age 27. Dynamic risk factors include above psychiatric symptoms which we are addressing with medication. Protective factors include no previous attempts, rational thinking intact, good social support, help seeking, future oriented and no guns at home.    Objective   General Appearance: Well groomed, appropriate eye contact  Attitude/Behavior: Cooperative  Motor: No psychomotor agitation or retardation, no tremor or other abnormal movements  Speech: Normal rate, volume, prosody  Gait/Station: WFL - Within functional limits  Mood: Depressed  Affect: Constricted  Thought Process: Linear, goal directed  Thought Associations: No loosening of associations  Thought Content:  (see HPI)  Perception: No perceptual abnormalities noted  Sensorium: Alert and oriented to person, place, time and situation  Insight: Intact  Judgement: Intact  Cognition: Cognitively intact to conversational testing with respect to attention, orientation, fund of knowledge, recent and remote memory, and language    Lab Review:   not applicable    Assessment/Plan   Problem List Items Addressed This Visit             ICD-10-CM    Anxiety F41.9    Bipolar affective disorder, current episode depressed (CMS/HCC) F31.30    Depression F32.A    Cannabis use disorder, moderate, dependence (CMS/HCC) F12.20    Panic disorder without agoraphobia with severe panic attacks F41.0       Provider impression/Recommendations  Carli Wang is a 27 year old female currently residing in  "[place], [place] with herself. She is employed full-time as a dietician traveling to local nursing homes. She is being seen as a referral from Dr. Magdaleno Starks at  - last/initial appointment on 2/27/23. Pphx: Bipolar type 2, OCD, anxiety w/ panic attacks, PTSD, Trichotillomania. Sought with multiple different providers in [facility] starting in childhood. Previously treated by Dr. Mao in [place] for the past 6 years.       h/o OCD  h/o PTSD   H/o Trichotillomania   bipolar and related disorder, unspecified   Panic disorder    (R/o cluster B personality traits)   cannabis use d/o     current medications   lorazepam 1mg BID  Reviewed OARRS on 12/8/23 - Ativan last filled on 12/1/23 for 60 tablets  Drug screen completed on 4/13/23 (during ER visit), + THC   Latuda 80mg daily   lamotrigine 250mg daily  Duloxetine 20mg daily (taking daily for two weeks)     - Mood is depressed but improved since last visit with improving anhedonia, sleep and symptoms of anxiety  - encouraged patient to continue reducing MJ use and continue in IOP   - Patient has second opinion scheduled next week with IOP provider and visit with Dr. Blackwell the following. Given recent improvements recommended continuing current regiment without change at this time.   - c/w Ativan 1mg BID. (Okay to use additional .5mg dose PRN to maintain functioning)   - c/w LTG 250mg daily   - c/w Duloxetine 20mg daily (taking daily for 1 week)   - c/w Latuda 80mg daily.   - FU visit in 2 week or sooner if needed  - reviewed medication supply with patient and placed refills as needed.   - patient is aware this provider is leaving  March 2024    Past Medication Trials:   \"trialed every single anti-depressants but I don't have a list\"   - including MAOIs  Lithium - last taken 3-4 years ago. + DM insipidus   Risperdal: ineffective, + panic attacks.   \"a patch\" w/ Dr. Mao (Emsam?)   Depakote - stopped in December, + weight gain, appetite has improved since.   Latuda - " "stopped d/t possible akathisia, weight loss   Seroquel - stopped during IOP   Abilify - unsure of response   Hydroxyzine - not helpful   clonidine - not helpful   BuSpar - \"tried that like three different times\"   Clonidine - not helpful   Propranolol - dizziness  Wellbutrin - short term trial   Remeron - stopped d/t worsening mood     Start time 2:02pm  End time pm 2:24pm  Prep/charting time: 5min   Total time min min 27min    sister Jess [phone]. Okay to call if needed per patient   "

## 2023-12-09 NOTE — PROGRESS NOTES
"4 PM [id number] Indiv Psych for BP II and Anxiety (60 MIN, [phone])  In-person. Supportive Therapy. \"Bad day\" on Monday. Struggling, thinks menstrual period may have also impacted. Rest of week better. Better over since ativan was increased but \"still feel terrible.\" Feels guilt at taking but does feel better on it, some relief from anxiety, body aches and chest tightness. Also, reported it allows her to do more of what she needs to do (get out, less avoidant). Has made decision to stop smoking cannabis. Reported on ativan, feeling like it's less necessary and hopes to reduce more/quit. Longest she's stopped in last 10 years has been at least a year. However, anxiety was not under control then. Felt good about being able to make it through meeting near [place] yesterday. Did go there in morning vs day before. Was distracted some during meetings. Last week, was crying more. Discussed going out last weekend, sister or REECE changed plans, wanted to go to several bars which they did. Felt overstimulated but didn't drink and got through it. Parents trying to help her more and provide emotional support. Mentioned some upcoming activities. Has missed several days of  General IOP this week. Some urges to self-harm this week but able to not engage, distracted self. Family activities with grandmother coming in, \"forced family fun.\" Will try to negotiate mom's desire to be involved but also taking steps to continue setting limits. Plans on doing [place] language meetup with cousin, hopes it will be a \"confidence booster.\" Reflecting on things that help her feel better-reading, baking, cooking, has gotten away from these activities. Did get to gym one time this week, wants to continue staying active (gym or waking outside) because it helps her mood. Felt increased appetite recently. Some concerns about worsened memory on ativan. Now 28, was 17 when last time she was making conscious effort to not drink/use cannabis. Grandmother " (84) coming in soon to visit from [place]. Discussed importance of constructive dialogue when symptoms emerge. Consider having her work thought records. Next: 1 week.

## 2023-12-19 ENCOUNTER — TELEMEDICINE (OUTPATIENT)
Dept: BEHAVIORAL HEALTH | Facility: CLINIC | Age: 28
End: 2023-12-19
Payer: COMMERCIAL

## 2023-12-19 DIAGNOSIS — F33.2 SEVERE EPISODE OF RECURRENT MAJOR DEPRESSIVE DISORDER, WITHOUT PSYCHOTIC FEATURES (MULTI): ICD-10-CM

## 2023-12-19 DIAGNOSIS — F41.1 GAD (GENERALIZED ANXIETY DISORDER): ICD-10-CM

## 2023-12-19 PROCEDURE — 99205 OFFICE O/P NEW HI 60 MIN: CPT | Performed by: PSYCHIATRY & NEUROLOGY

## 2023-12-19 PROCEDURE — 99417 PROLNG OP E/M EACH 15 MIN: CPT | Performed by: PSYCHIATRY & NEUROLOGY

## 2023-12-19 NOTE — PROGRESS NOTES
"Psychiatry Procedure Initial Intake    Name: Calri  : 1995  MRN: [id number]    Date: 23   Started at 8:30 am; ended 9:38am  Review records: 5 minutes  Additional documentation: 10 minutes       Reason for Referral: Second Opinion     HPI: She said she has had depression for years and was hospitalized early this year. She said she tried a number of medications  and her depression worse and worse. She said her provider and herself agreed to have a second opinion. She said last time she felt \"normal\" was a couple of years ago. Currently, she felt depressed daily all the time with severity of 5-10. Ten was worst. Also felt hopeless, and helpless all the time, and worthless sometimes. She said she has had passive SI for 3 months. Denied having active SI, plan or intent. Denied having HI/AVH or paranoia. Still enjoyed some activities such as exercising, cooking and reading. No problem of falling or staying asleep. Slept about 8 hours per night. Felt rested in the morning. Energy was fine during daytime. Concentration was not good. Able to read about 15 minutes with repeating. No appetite, lost 20 pounds in 3 months. She said she felt restless and irritable most of the time. No physical violence.   Able to take care of herself most of the time; neglected her hygiene and eating, unable to function full time and unable to take care of her living space.   Currently taking lamotrigine 250 mg/d, for 5 months   Cymbalta 20 mg/d for 2 months  Latuda 80 mg for 4 months   Ativan 1 mg bid PRN  No side effect from the meds.     PPH: hospitalized once due to depression early this years. No SA or violence toward others. First depression was 4-6 yo, no trigger for her depression. Denied having manic/hypomanic sx. Denied having persistently increased energy or persistent elevated mood. She said she had periods of very irritable and angry for several hours in the past. Because of that she was diagnosed with bipolar " disorder. Had seen 6 psychiatrists and 4 Nps.  Saw 4 therapists and had CBT before.     PMHx: de  No DM, heart disease, asthma, thyroid problem, seizure or head injury; hx of hypothyroidism due to lithium   No problem with eyes, ears, nose or throat  No SOB  or chest pain. No HTN.   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 No medication for migraine. No other neurological problem   No problem with bones, joints, or muscles   Hx of anemia, currently anemic   Rashes on legs, monitored by her dermatologist   No auto immune disease     Allergies:  No Known Allergies    Past meds.   Tried Lithium - helped a little, but not back normal  Depakote - did not work   Seroquel - helped, but not back to normal. Very sedated   Ability - did not work   Zoloft - 200  mg/d, did not help  Prozac - not sure, did not   Lexapro - did not work   Effexor - did not work   Wellbutrin - did not help   Risperdal - tried several time, but did not work.   Emsam - for a short period with low dose   Inderal, hydroxyzine       Past Psychiatric History:   Previous therapy: yes  Previous psychiatric treatment and medication trials: yes -    Previous psychiatric hospitalizations: yes -    Previous diagnoses: yes -    Previous suicide attempts: no  History of violence: no  Currently in treatment with yes.  Education: some college and college graduate  Other pertinent history: None  Depression screening was performed with standardized tool: Yes - Depression    Substance Abuse History:  Use of caffeine: coffee 1-2 /day  Tobacco use: no  Legal consequences of chemical use: no  Use of OTC medications: none  Other Substance(s) Used:       Surgical History:  No past surgical history on file.  Past Surgical History Comments:    Family History:  Maternal grandmother - bipolar disorder, committed suicide.  Mother - anxiety, seeing a therapy.  Maternal aunt - alcohol problem.   No fx of sudden death   No fx of heart attack  No fx of DM  No fx of stroke    Social History:  Born and raised in [facility] [place]; has an older brother and an younger sister; childhood was [place]. No hx of abused; was a good student in . Socially, she was very outgoing and had a lot of friends; had 3 long-term relationships. Longest one was 4 years. Worked trough 2 jobs, longer was one 2 years. Never . No kids. Currently, she lives alone. Financially, she  was ok.     Psychiatric Review Of Systems:  Depression: see HPI  Manic Symptoms: denied   Psychotic Symptoms: denied   General Anxiety Symptoms:Panic attacks, Excessive worry, Difficulty controlling worry, Easily fatigued due to worry, Difficulty concentrating due to worry, Irritability due to worry, Muscle tension due to worry, and Sleep disturbances due to worry   Social Anxiety Symptoms:  denied   Panic Symptoms:  panic related to GUY  Disruptive Mood Symptoms:  denied   Obsessive/Compulsive Symptoms:  some repetitive behavior   Delirium/AMS Symptoms: none  Disordered Eating Symptoms: denied   PTSD Symptoms: denied   Attention Deficit Symptoms:  denied   Hyperactivity Symptoms:  denied  Conduct Issues: denied    Other Symptoms Concerns: denied     MSE  Appearance: well-groomed.   Demeanor: average.   Eye Contact: average.   Motor Activity: average.   Speech: clear.   Language: Neurologic language is intact.   Fund of Knowledge: intact fund of knowledge.   Delusions: None Reported.   Hallucinations: not reported  Self Harm: None Reported.   Aggressive: None Reported.   Mood: depressed and anxious.   Affect: labile, tearful on and off  Orientation: alert, oriented x3.   Manner: cooperative.   Thought process: goal-directed.   Thought association: displays rational thought process.   Content of thought: normal  Abstract/ Rational Thought: intact   Memory: grossly intact.   Behavior: normal motor activity, relaxed, good eye  contact, calm.   Attention/Concentration: normal.   Cognition: intact.   Intelligence Estimate: average.   Executive Function: intact.   Insight: intact.   Judgement: intact.   Musculoskeletal: normal strength and tone. No abnormal movement.  Impression: MDD, chronic, highly moderate to low severe   GUY - mild   Panic attacks  Cannabis use disorder?  No immediate risk to self or others   No contraindication to ketamine treatment, ECT or TMS.   Discussion/plans: options including other antidepressants, different combination of antidepressants, mood stabilizers, and/or antipsychotics, TMS, ketamine infusion, intranasal esketamine, and ECT were explained to her. She fully understood   Pros and cons from each other options were discussed with her fully understood.   The pros and cons including potential cardiovascular complications, dissociation, psychosis or john from ketamine treatment were explained to her. She fully understood.   She was informed that ketamine infusion is a self-pay service at  with $400.00 per session. She fully understood.   She said she would think about it and let us know if she considers ketamine infusion or intranasal esketamine at .   Angela Blackwell MD

## 2023-12-20 ENCOUNTER — DOCUMENTATION (OUTPATIENT)
Dept: BEHAVIORAL HEALTH | Facility: CLINIC | Age: 28
End: 2023-12-20
Payer: COMMERCIAL

## 2023-12-20 NOTE — PROGRESS NOTES
Outreach attempted at 10:40am per patient request. Was able to reach patient and discussed recent visit r/t TMS/ECT/Ketamine - answered all questions appropriately. FU already schedule for this Friday.

## 2023-12-22 ENCOUNTER — OFFICE VISIT (OUTPATIENT)
Dept: BEHAVIORAL HEALTH | Facility: CLINIC | Age: 28
End: 2023-12-22
Payer: COMMERCIAL

## 2023-12-22 ENCOUNTER — TELEMEDICINE (OUTPATIENT)
Dept: BEHAVIORAL HEALTH | Facility: CLINIC | Age: 28
End: 2023-12-22
Payer: COMMERCIAL

## 2023-12-22 DIAGNOSIS — F31.81 BIPOLAR 2 DISORDER (MULTI): ICD-10-CM

## 2023-12-22 DIAGNOSIS — F31.30 BIPOLAR AFFECTIVE DISORDER, CURRENT EPISODE DEPRESSED, CURRENT EPISODE SEVERITY UNSPECIFIED (MULTI): ICD-10-CM

## 2023-12-22 DIAGNOSIS — F41.0 PANIC DISORDER WITHOUT AGORAPHOBIA WITH SEVERE PANIC ATTACKS: ICD-10-CM

## 2023-12-22 DIAGNOSIS — F41.9 ANXIETY: ICD-10-CM

## 2023-12-22 DIAGNOSIS — F32.A DEPRESSION, UNSPECIFIED DEPRESSION TYPE: ICD-10-CM

## 2023-12-22 DIAGNOSIS — F12.20 CANNABIS USE DISORDER, MODERATE, DEPENDENCE (MULTI): ICD-10-CM

## 2023-12-22 PROCEDURE — 99214 OFFICE O/P EST MOD 30 MIN: CPT

## 2023-12-22 PROCEDURE — 90837 PSYTX W PT 60 MINUTES: CPT | Performed by: PSYCHOLOGIST

## 2023-12-22 PROCEDURE — 1036F TOBACCO NON-USER: CPT | Performed by: PSYCHOLOGIST

## 2023-12-22 RX ORDER — DULOXETINE 40 MG/1
40 CAPSULE, DELAYED RELEASE ORAL DAILY
Qty: 30 CAPSULE | Refills: 1 | Status: SHIPPED | OUTPATIENT
Start: 2023-12-22 | End: 2024-01-19 | Stop reason: DRUGHIGH

## 2023-12-22 RX ORDER — LAMOTRIGINE 25 MG/1
50 TABLET ORAL DAILY
Qty: 60 TABLET | Refills: 1 | Status: SHIPPED | OUTPATIENT
Start: 2023-12-22 | End: 2024-02-14 | Stop reason: SDUPTHER

## 2023-12-22 RX ORDER — LURASIDONE HYDROCHLORIDE 80 MG/1
80 TABLET, FILM COATED ORAL DAILY
Qty: 30 TABLET | Refills: 0 | Status: SHIPPED | OUTPATIENT
Start: 2023-12-22 | End: 2024-01-19 | Stop reason: SDUPTHER

## 2023-12-22 RX ORDER — LORAZEPAM 1 MG/1
1 TABLET ORAL 2 TIMES DAILY PRN
Qty: 60 TABLET | Refills: 0 | Status: SHIPPED | OUTPATIENT
Start: 2023-12-29 | End: 2024-01-19 | Stop reason: SDUPTHER

## 2023-12-22 RX ORDER — LAMOTRIGINE 100 MG/1
200 TABLET ORAL DAILY
Qty: 60 TABLET | Refills: 1 | Status: SHIPPED | OUTPATIENT
Start: 2023-12-22 | End: 2024-02-14 | Stop reason: SDUPTHER

## 2023-12-22 NOTE — PROGRESS NOTES
"Subjective   Patient ID: Carli Wang is a 28 y.o. female who presents for Anxiety, Depression, Bipolar, and Med Management Last visit with this writer on 12/8/23.     [facility]  Patient arrived on-time for virtual visit. Calm and cooperative. A/Ox3. Well groomed and focused during visit. Note reviewed from visit with Dr. Blackwell - provided outside referral to [facility]. Patient continues in IOP at [facility] which is going well. Had visit with IOP prescriber which revealed no benefits. Covid + last week, patient missed IOP and will most likely extend beyond initial discharge date. Patient continues to reduce Delta 8 use (no illicit MJ use). Mood remains depressed - \"a little worse\" the past week. NSSIB reported but patient denies any SI/HI. She will be staying with her family for the holidays and reports that family and friends often check on her and provide support. Discussing tactics to avoid NSSIB with Dr. Starks.     -- Depression --   Mood: depressed   - \"most depressed I've ever been\"   - cooking for self which patient reports is a \"positive\"  Enjoying social interactions when able   Daily snacking - no consistent meals   Subtly improving appetite   Sleep: 10-12 hours/night   - no nightmares  - sleeping in bed which is a positive change per patient   Energy levels: + fatigue, covid influenced   Focus/concentration: focused during visit, \"improved\" overall  - improved functioning at work per patient    Hopelessness: endorses   + NSSIB and hitting self   When asked directly, patient denies any SI/HI plan or intent     Patient is confident she can continue to keep herself safe and is accepting of safety resources. Discussed voluntary admission - patient differs and does not meet criteria for involuntary admission. Patient does agree to continue relying on her support system and agrees to call 911 or report to the ER if SI w/ a plan or intent was recognized. Discussed intake process at [facility] vs " [facility].     Bipolar ROS   No john/hypomania since last visit   + hx of BPII which patient disagrees with     AVH - denies  No fear/paranoia  No depersonalization/derealization   Does not appear internally stimulated     OCD  + intrusive thoughts r/t death (not purely SI)   No checking behaviors, previously reported  Improving intrusive thoughts related to safety at home.   No skin picking/hair pulling     - anxiety -   \"Constant feeling of panic\"   Restlessness/keyed up or on edge: endorses   Irritability: endorses  Muscle tension: endorses, + muscle pain   Sleep disturbance: denies   Panic attacks: endorses  + reoccurring panic attacks     Substance use   Alcohol: No alcohol use since last visit.   Denies tobacco use   Caffeine: (1-2 cups of coffee per day)   Marijuana: daily use, reducing (delta 8 products)   No illicit drug use reported   No hx of treatment for JESSICA     Safety:   Patient is moderate acute and moderate chronic risk of suicide. Static risk factors include female gender,  race and age 27. Dynamic risk factors include above psychiatric symptoms which we are addressing with medication. Protective factors include no previous attempts, rational thinking intact, good social support, help seeking, future oriented and no guns at home.    Objective   General Appearance: Well groomed, appropriate eye contact  Attitude/Behavior: Cooperative  Motor: No psychomotor agitation or retardation, no tremor or other abnormal movements  Speech: Normal rate, volume, prosody  Gait/Station: WFL - Within functional limits  Mood: depressed  Affect: Constricted  Thought Process: Linear, goal directed  Thought Associations: No loosening of associations  Thought Content: Normal  Perception: No perceptual abnormalities noted  Sensorium: Alert and oriented to person, place, time and situation  Insight: Fair  Judgement: Fair  Cognition: Cognitively intact to conversational testing with respect to attention, " orientation, fund of knowledge, recent and remote memory, and language    Lab Review:   not applicable    Assessment/Plan   Problem List Items Addressed This Visit             ICD-10-CM    Anxiety F41.9    Relevant Medications    LORazepam (Ativan) 1 mg tablet (Start on 12/29/2023)    DULoxetine 40 mg DR capsule    Bipolar affective disorder, current episode depressed (CMS/[facility]) F31.30    Relevant Medications    lurasidone (Latuda) 80 mg tablet    Depression F32.A    Cannabis use disorder, moderate, dependence (CMS/[facility]) F12.20    Panic disorder without agoraphobia with severe panic attacks F41.0     Other Visit Diagnoses         Codes    Bipolar 2 disorder (CMS/[facility])     F31.81    Relevant Medications    LORazepam (Ativan) 1 mg tablet (Start on 12/29/2023)    lamoTRIgine (LaMICtal) 100 mg tablet    lamoTRIgine (LaMICtal) 25 mg tablet    DULoxetine 40 mg DR capsule              Provider impression/Recommendations  Carli Wang is a 27 year old female currently residing in [place], [place] with herself. She is employed full-time as a dietician traveling to local nursing homes. Pphx: Bipolar type 2, OCD, anxiety w/ panic attacks, PTSD, Trichotillomania. Sought with multiple different providers in [facility] starting in childhood. Previously treated by Dr. Mao in [place] for the past 6 years before initiating care at  in 2023.       DSM-5 diagnosis   Anxiety and depression (h/o Bipolar d/o)   Panic disorder    cannabis use d/o   NSSIB  h/o OCD  h/o PTSD   h/o Trichotillomania   (R/o Cluster B traits)     current medications   Lorazepam 1mg BID  Reviewed OARRS on 12/22/23 - Ativan last filled on 12/1/23 for 60 tablets  Drug screen completed on 4/13/23 (during ER visit), + THC   Latuda 80mg daily   lamotrigine 250mg daily  Duloxetine 20mg daily     - Mood is Depressed. NSSIB reported since last visit. No SI/HI  - Continues to meet criteria for MDD. No Elayne/hypomania since last visit   - Anxiety and panic  "symptoms present and impactful   - encouraged patient to continue reducing MJ use and continue in IOP at [facility]   - Provided referral to [facility] for TMS/Ketamine per patient request.   - c/w Ativan 1mg BID. Will continue Benzodiazepine at this time for the purposes of Harm reduction. Discussed the overall goal of continuing Ativan in the short-term to allow patient to maintain functioning but will avoid long-term which she is understanding of.   - c/w LTG 250mg daily   - Increase Duloxetine dose to 40mg daily   - c/w Latuda 80mg daily.   - FU visit in 4 week or sooner if needed  - reviewed medication supply with patient and placed refills as needed.   - patient is aware this provider is leaving  March 2024    Past Medication Trials:   \"trialed every single anti-depressants but I don't have a list\"   Lithium - last taken 3-4 years ago. + DM insipidus   Risperdal: ineffective, + panic attacks.   \"a patch\" w/ Dr. Mao (Emsam?)   Depakote - stopped in December, + weight gain  Seroquel - stopped during IOP   Abilify - unsure of response   Hydroxyzine - not helpful   clonidine - not helpful   BuSpar - \"tried that like three different times\"   Clonidine - not helpful   Propranolol - dizziness  Wellbutrin - short term trial, unsure of response   Remeron - stopped d/t worsening mood     Start time 2:02pm   End time 2:25pm   Prep/charting time: 10min  Total time min 33min    sister Jess [phone]. Okay to call if needed per patient   "

## 2023-12-22 NOTE — PROGRESS NOTES
"1PM 09591 Indiv Psych for BP II and Anxiety (60 MIN, 103-158)  In-person. Supportive Therapy. Spent first part of session discussing distress related to consult with Dr. Blackwell. Was expecting more direction from him re potential tx's (ketamine, TMS, ECT). She was encouraged from him that he didn't think she had bipolar disorder. It's unclear to me at this time. Has continued to have persistent agitation, anxiety, overwhelming at times. Has engaged in some self-harm (cutting, banging head) and we discussed alternate means that are more productive and reinforce self-care. Has had difficulty \"getting through the day\" on many days. To make matters worse, got covid and out from office much of last week. Reported her pharmacologist will discuss treatments with Dr. Blackwell. We also discussed today, considering a transition to another psychiatric provider sooner and not waiting until current provider leaves in March. Will get list of names for potential providers for her. Discussed her \"not caring enough\" to try alternate strategies to self-sooth. Is considering cutting back on caffeine and I think it's worth considering. Also, has cut back on cannabis but did find cannabis helpful to soothe emotionally, slippery slope. Still feeling ativan is helping but getting accustomed to higher dose. Parents are worried about her and mother is offering to help her more. Some exercise but not consistently. Still getting [facility] but has not been there consistently, may extend time there and said she'll be doing aftercare. Spending x-mas with sister and parents in [place]. Neet: 2 weeks.   "

## 2023-12-24 NOTE — PATIENT INSTRUCTIONS
Continue Lamotrigine 250mg daily   Continue Ativan 1mg, twice per day  Continue latuda 80mg daily  Increase Duloxetine dose to 40mg daily  Continue in [facility] IOP   Follow up visit in 4 weeks or sooner if needed  Please call the office with any questions or concerns

## 2023-12-28 ENCOUNTER — APPOINTMENT (OUTPATIENT)
Dept: BEHAVIORAL HEALTH | Facility: CLINIC | Age: 28
End: 2023-12-28
Payer: COMMERCIAL

## 2024-01-05 ENCOUNTER — TELEMEDICINE (OUTPATIENT)
Dept: BEHAVIORAL HEALTH | Facility: CLINIC | Age: 29
End: 2024-01-05
Payer: COMMERCIAL

## 2024-01-05 DIAGNOSIS — F41.9 ANXIETY: ICD-10-CM

## 2024-01-05 DIAGNOSIS — F31.81 BIPOLAR II DISORDER, MILD, DEPRESSED, WITH ANXIOUS DISTRESS (MULTI): ICD-10-CM

## 2024-01-05 PROCEDURE — 90837 PSYTX W PT 60 MINUTES: CPT | Performed by: PSYCHOLOGIST

## 2024-01-07 NOTE — PROGRESS NOTES
"4PM 38242 Indiv Psych for BP II and Anxiety (60 MIN, 406-500)  In-person. Supportive Therapy. Mood \"up and down\". Still using delta 8 product. Also taking ativan as prescribed. Has been running a few times, even got out with running club, sister went with her, plans on doing again. Thinking of discontinuing [facility], going to do aftercare. Frustrated with friend,  who sent her something on cannabis. Has decided she isn't going to discuss treatment with him anymore. Going to start ketamine/spravato soon if get get insurance approval. Would be 2 times/wek for 4 weeks, then 1 time per week for a month. Maybe feeling a little better with increase in duloxetine (60 mg/day). Fell behind at work, talked to, hopes to stay current now. Increased restlessness. Anxiety with leaving place, stairs, increased anxiety about pet, parents dying. Feels physical pain when anxious. Now eating less. Next: 2 weeks.   "

## 2024-01-19 ENCOUNTER — TELEMEDICINE (OUTPATIENT)
Dept: BEHAVIORAL HEALTH | Facility: CLINIC | Age: 29
End: 2024-01-19
Payer: COMMERCIAL

## 2024-01-19 DIAGNOSIS — F32.A DEPRESSION, UNSPECIFIED DEPRESSION TYPE: ICD-10-CM

## 2024-01-19 DIAGNOSIS — Z86.59 HISTORY OF OCD (OBSESSIVE COMPULSIVE DISORDER): ICD-10-CM

## 2024-01-19 DIAGNOSIS — F41.0 PANIC DISORDER WITHOUT AGORAPHOBIA WITH SEVERE PANIC ATTACKS: ICD-10-CM

## 2024-01-19 DIAGNOSIS — Z00.00 HEALTH CARE MAINTENANCE: ICD-10-CM

## 2024-01-19 DIAGNOSIS — F31.81 BIPOLAR 2 DISORDER (MULTI): ICD-10-CM

## 2024-01-19 DIAGNOSIS — F41.9 ANXIETY: ICD-10-CM

## 2024-01-19 DIAGNOSIS — F31.30 BIPOLAR AFFECTIVE DISORDER, CURRENT EPISODE DEPRESSED, CURRENT EPISODE SEVERITY UNSPECIFIED (MULTI): ICD-10-CM

## 2024-01-19 DIAGNOSIS — F12.20 CANNABIS USE DISORDER, MODERATE, DEPENDENCE (MULTI): ICD-10-CM

## 2024-01-19 PROCEDURE — 90837 PSYTX W PT 60 MINUTES: CPT | Performed by: PSYCHOLOGIST

## 2024-01-19 PROCEDURE — 99213 OFFICE O/P EST LOW 20 MIN: CPT

## 2024-01-19 RX ORDER — LURASIDONE HYDROCHLORIDE 80 MG/1
80 TABLET, FILM COATED ORAL DAILY
Qty: 30 TABLET | Refills: 1 | Status: SHIPPED | OUTPATIENT
Start: 2024-01-19 | End: 2024-02-14 | Stop reason: SDUPTHER

## 2024-01-19 RX ORDER — LORAZEPAM 1 MG/1
.5-1 TABLET ORAL SEE ADMIN INSTRUCTIONS
Qty: 60 TABLET | Refills: 0 | Status: SHIPPED | OUTPATIENT
Start: 2024-01-31 | End: 2024-03-06 | Stop reason: DRUGHIGH

## 2024-01-19 RX ORDER — DULOXETIN HYDROCHLORIDE 60 MG/1
60 CAPSULE, DELAYED RELEASE ORAL DAILY
Qty: 30 CAPSULE | Refills: 1 | Status: SHIPPED | OUTPATIENT
Start: 2024-01-19 | End: 2024-02-14 | Stop reason: SDUPTHER

## 2024-01-19 NOTE — PATIENT INSTRUCTIONS
Continue Lamotrigine 250mg daily   Increase Duloxetine dose to 60mg daily   Continue Latuda 80mg daily   Continue ativan 1.5mg daily with additional .5mg as needed   Please complete lab work   Follow up visit on 2/14/24 at 4pm (virtual)   Please call the office or message via [employer] with any questions or concerns

## 2024-01-19 NOTE — PROGRESS NOTES
"Subjective   Patient ID: Carli Wang is a 28 y.o. female who presents for Anxiety, Depression, Panic Attack, OCD (Obsessive-Compulsive Disorder), and Med Management Last visit with this writer on 12/22/23.     HPI  Patient arrived on-time for virtual visit. Calm and cooperative on camera. Affect is dysphoric but reactive. Mood congruent. A/Ox3. Since last visit patient requested FMLA leave d/t severity of symptoms and she is starting Spravato treatment at [facility] TMS sometime next week or in the near future - FMLA completed for continuous leave and returned to patient. When asked how she is doing patient reports \"Slightly more manageable\" since taking leave - supervisor supported choice to take leave. Mood remains depressed with reoccurring panic attacks but improving. Functioning at work significantly impaired prior to taking leave. Patient discussed Ativan with provider at [facility] - encouraged to avoid on days receiving intranasal injection which patient is agreeable to. Ativan use decreasing overall - 1.5mg total some days with positive response. Has appointment scheduled with Dr. Mena in early March. Continues in IOP at [facility] that may be ending soon. Patient does endorse NSSIB most days prior to leaving work - none since taking leave. + passive SI, no plan or intent. Continues to see established counselor Dr. Starks last visit on 1/5/24 (note reviewed). Resuming exercise routine and enjoying social interactions - \"Trying to do as much as possible\". Continues to use delta 8 products most days. Tolerating higher dose of Duloxetine well - no side effects reported.     -- Depression --   Mood: 2-3/10   0=lowest mood, 10=great mood.   Enjoying social interactions when able   Resuming exercise and baking   Daily snacking - no consistent meals   Sleep: 12 hours/night   - no nightmares  - sleeping in bed which is a positive change per patient   Energy levels: + fatigue  Focus/concentration: " "focused during visit, improving   Hopelessness: endorses   + passive SI, no plan or intent   no NSSIB since taking leave from work on 1/12/24.     Patient is confident she can continue to keep herself safe and is accepting of safety resources. Patient differs and does not meet criteria for involuntary admission. Patient does agree to continue relying on her support system and agrees to call 911 or report to the ER if SI w/ a plan or intent was recognized. Discussed intake process at [facility] vs [facility].     Bipolar ROS   No john/hypomania since last visit   + hx of BPII which patient disagrees with     AVH - denies  No delusional thinking noted   Does not appear internally stimulated   Patient does endorse dissociative episode x1 at work. Not experienced before, not reoccurring     OCD  + nighttime/evening intrusive thoughts r/t death and safety of family members (not purely SI)   - \"it all happens at night\", \"tend to spiral at night\"  - intermittent checking behaviors   - No skin picking/hair pulling     - anxiety -   No new stressors - \"the only thing stressing me is how bad I feel\".   Restlessness/keyed up or on edge: endorses   Muscle tension: endorses, + muscle pain   Panic attacks: endorses  + reoccurring panic attacks, decreasing in duration. Previously reported extended periods of intense panic symptoms - reports shorter duration \"Super traumatic panic attacks\" now.     Substance use   Alcohol: No alcohol use since last visit.   Denies tobacco use   Caffeine: denies - recently cut off caffeine   Marijuana: daily use, reducing (delta 8 products)   No illicit drug use reported     Safety:   Patient is moderate acute and moderate chronic risk of suicide. Static risk factors include female gender,  race and age 27. Dynamic risk factors include above psychiatric symptoms which we are addressing with medication. Protective factors include no previous attempts, rational thinking intact, good " social support, help seeking, future oriented and no guns at home.    Objective   General Appearance: Fairly groomed  Attitude/Behavior: Cooperative  Motor: No psychomotor agitation or retardation, no tremor or other abnormal movements  Speech: Normal rate, volume, prosody  Gait/Station: WFL - Within functional limits  Mood: depressed  Affect: Dysphoric, constricted but reactive  Thought Process: Linear, goal directed  Thought Associations: No loosening of associations  Thought Content:  (see HPI)  Perception: No perceptual abnormalities noted  Sensorium: Alert and oriented to person, place, time and situation  Insight: Intact  Judgement: Intact  Cognition: Cognitively intact to conversational testing with respect to attention, orientation, fund of knowledge, recent and remote memory, and language    Lab Review:   not applicable    Assessment/Plan   Problem List Items Addressed This Visit             ICD-10-CM    Anxiety F41.9    Relevant Medications    LORazepam (Ativan) 1 mg tablet (Start on 1/31/2024)    Bipolar affective disorder, current episode depressed (CMS/[facility]) F31.30    Relevant Medications    DULoxetine (Cymbalta) 60 mg DR capsule    lurasidone (Latuda) 80 mg tablet    Depression F32.A    Relevant Medications    DULoxetine (Cymbalta) 60 mg DR capsule    Cannabis use disorder, moderate, dependence (CMS/[facility]) F12.20    Panic disorder without agoraphobia with severe panic attacks F41.0    History of OCD (obsessive compulsive disorder) Z86.59     Other Visit Diagnoses         Codes    Health care maintenance     Z00.00    Relevant Orders    Drug Screen, Urine With Reflex to Confirmation    Benzodiazepine Confirmation, Urine    Bipolar 2 disorder (CMS/[facility])     F31.81    Relevant Medications    LORazepam (Ativan) 1 mg tablet (Start on 1/31/2024)        Provider impression/Recommendations  Carli Wang is a 27 year old female currently residing in [place], [place] with herself. She is employed full-time as  a dietician traveling to local nursing homes.   Pphx: Bipolar type 2, OCD, anxiety w/ panic attacks, PTSD, Trichotillomania. Sought with multiple different providers in [facility] starting in childhood. Previously treated by Dr. Mao in [place] for the past 6 years before initiating care at  in 2023. Hospitalized for SI at  4/15/23-4/19/23 and completed adult IOP at  after. Started Evening IOP at [facility] fall of 2023.       DSM-5 diagnosis   Anxiety and depression (h/o Bipolar d/o)   Panic disorder    cannabis use d/o   NSSIB  OCD  h/o PTSD   h/o Trichotillomania   (R/o Cluster B traits)     current medications   Lorazepam 1mg BID  Reviewed OARRS on 1/19/24 - Ativan last filled on 1/2/24 for 60tablets/30days   Drug screen completed on 4/13/23 (during ER visit), + THC   Latuda 80mg daily   lamotrigine 250mg daily  Duloxetine 40mg daily   Good medication adherence   No EPS/TD reported or observed   No bothersome side effects or ADRs reported   Intermittent restlessness reported but no akathisia noted     - Mood remains depressed. No NSSIB since stopping work. + passive SI, no plan or intent. Patient is well supported at home and has available safety resources. Previously differed on voluntary admission and discussed intake process at [facility].   - Continues to meet criteria for MDD. No Elayne/hypomania since last visit and overall patient disagrees with past bipolar diagnosis.   - Anxiety and panic symptoms present and impactful along with intrusive thoughts and intermittent checking behaviors   - encouraged patient to continue reducing delta 8 use and FU with providers at [facility]   - specifically discussed completely avoiding ativan on days receiving intranasal injection which patient is understanding of and agreeable to. This writer will contract provider at [facility] to discuss case and ensure no details are lost in care.   - reduce Ativan dose to 1.5mg daily with additional  "0.5mg (half tablet) PRN for anxiety.   - Will continue Benzodiazepine at this time for the purposes of Harm reduction. Discussed the overall goal of continuing Ativan in the short-term but will avoid long-term which she is understanding of.   - c/w LTG 250mg daily   - Increase Duloxetine dose to 60mg daily   - c/w Latuda 80mg daily.   - repeat drug screen ordered   - discussed overall FMLA process and intermittent FMLA with spravato if needed   - FU visit in 3 week or sooner if needed  - reviewed medication supply with patient and placed refills as needed.     Past Medication Trials:   \"trialed every single anti-depressants but I don't have a list\"   Lithium - last taken 3-4 years ago. + DM insipidus   Risperdal: ineffective, + panic attacks.   \"a patch\" w/ Dr. Mao (Emsam?)   Depakote - stopped in December, + weight gain  Seroquel - stopped during IOP   Abilify - unsure of response   Hydroxyzine - not helpful   clonidine - not helpful   BuSpar - \"tried that like three different times\"   Clonidine - not helpful   Propranolol - dizziness  Wellbutrin - short term trial, unsure of response   Remeron - stopped d/t worsening mood   Gabapentin - not helpful     Start time 2:31pm   End time 2:53pm   Prep/charting time: 5min   Total time 27min     sister Jess [phone]. Okay to call if needed per patient   "

## 2024-01-19 NOTE — PROGRESS NOTES
4PM 71574 Indiv Psych for BP II and Anxiety (60 MIN, 403-699)  Virtual. Supportive Therapy. Hasn't been doing well, anxious, some depression. FMLA leave this week, better when not stressed as much, not working. Will be off a few more weeks. Prior to leave, cutting routinely to cope, not eating. Hasn't self-harmed in a week. One day, did cold shower which helped. Discussed importance of finding healthier alternate strategies. Reported negative news, trigger for cutting. As an example, at work, got feedback from boss that facility personnel said she wasn't around. Had significant panic attack recently, begins to think of people dying (e.g., parents), gets engrossed in it emotionally. Difficulty using reframing techniques. Anxiety continues to be worse at night, fearful of going to sleep. Little better this past week. Started [facility] group, been one time. Likes idea of support group. Had hoped to start spravada next week but was pushed back to week after next. Has activities to engage--baking, working out, time with friends, needs 75 CEU's over next 1.5 years. Duloxetine increased to 60 mg/day from 40 mg/day earlier when she met with pharmacologist. Next: 2 weeks.

## 2024-01-21 PROBLEM — Z86.59 HISTORY OF OCD (OBSESSIVE COMPULSIVE DISORDER): Status: ACTIVE | Noted: 2024-01-21

## 2024-02-02 ENCOUNTER — OFFICE VISIT (OUTPATIENT)
Dept: BEHAVIORAL HEALTH | Facility: CLINIC | Age: 29
End: 2024-02-02
Payer: COMMERCIAL

## 2024-02-02 DIAGNOSIS — F31.81 BIPOLAR 2 DISORDER (MULTI): ICD-10-CM

## 2024-02-02 DIAGNOSIS — F41.9 ANXIETY: ICD-10-CM

## 2024-02-02 PROCEDURE — 90837 PSYTX W PT 60 MINUTES: CPT | Performed by: PSYCHOLOGIST

## 2024-02-02 PROCEDURE — 1036F TOBACCO NON-USER: CPT | Performed by: PSYCHOLOGIST

## 2024-02-02 NOTE — PROGRESS NOTES
"4PM 20789 Indiv Psych for BP II and Anxiety (60 MIN, 402-500)  Virtual. Supportive Therapy. Back to work this week. Hasn't yet started Spravato, due most recently to a pharmacy mix up. So rather than take additional time off went back to work this week. Supposed to start now on Monday then Wed, and two days for rest of month. Will use PTO time, first tx at 3pm Monday. Feels the duloxetine increase to 60 mg/day may be helping some. Taking tylonel for body pains. Ellis has been going in with her at work at one facility. While she was off, boss determined that her load was high in terms of expected duties, going to try and get her some help. Had a panic attack yesterday during long meeting. Still has more severe anxiety at night. Now taking ativan 1mg in entire dose at night, before anxiety gets too bad. Takes 1 mg in AM as well. Working out more at [employer], 5 times/week. Said she's using less cannabis No new self-harm/cutting since last meeting. May look into a support group. Still attending  [facility] after-care group on Wednesday evenings. Plans on continuing. Back to Baptism activities. Has a high school student function tomorrow. \"Trying to stay busy.\" Trying to eat more routinely but still trying to lose weight. Doing 5 min/day daily meditation. Some concerns about low iron, may discuss with PCP. Next: 2 weeks.   "

## 2024-02-14 ENCOUNTER — TELEMEDICINE (OUTPATIENT)
Dept: BEHAVIORAL HEALTH | Facility: CLINIC | Age: 29
End: 2024-02-14
Payer: COMMERCIAL

## 2024-02-14 DIAGNOSIS — F41.9 ANXIETY: ICD-10-CM

## 2024-02-14 DIAGNOSIS — F32.A DEPRESSION, UNSPECIFIED DEPRESSION TYPE: ICD-10-CM

## 2024-02-14 DIAGNOSIS — Z86.59 HISTORY OF OCD (OBSESSIVE COMPULSIVE DISORDER): ICD-10-CM

## 2024-02-14 DIAGNOSIS — F31.30 BIPOLAR AFFECTIVE DISORDER, CURRENT EPISODE DEPRESSED, CURRENT EPISODE SEVERITY UNSPECIFIED (MULTI): ICD-10-CM

## 2024-02-14 DIAGNOSIS — F12.20 CANNABIS USE DISORDER, MODERATE, DEPENDENCE (MULTI): ICD-10-CM

## 2024-02-14 DIAGNOSIS — F31.81 BIPOLAR 2 DISORDER (MULTI): ICD-10-CM

## 2024-02-14 DIAGNOSIS — F41.0 PANIC DISORDER WITHOUT AGORAPHOBIA WITH SEVERE PANIC ATTACKS: ICD-10-CM

## 2024-02-14 PROCEDURE — 99213 OFFICE O/P EST LOW 20 MIN: CPT

## 2024-02-14 PROCEDURE — 1036F TOBACCO NON-USER: CPT

## 2024-02-14 RX ORDER — DULOXETIN HYDROCHLORIDE 60 MG/1
60 CAPSULE, DELAYED RELEASE ORAL DAILY
Qty: 30 CAPSULE | Refills: 1 | Status: SHIPPED | OUTPATIENT
Start: 2024-02-14 | End: 2024-03-06 | Stop reason: SDUPTHER

## 2024-02-14 RX ORDER — LURASIDONE HYDROCHLORIDE 80 MG/1
80 TABLET, FILM COATED ORAL DAILY
Qty: 30 TABLET | Refills: 1 | Status: SHIPPED | OUTPATIENT
Start: 2024-02-14 | End: 2024-05-29 | Stop reason: SINTOL

## 2024-02-14 RX ORDER — LAMOTRIGINE 100 MG/1
200 TABLET ORAL DAILY
Qty: 60 TABLET | Refills: 1 | Status: SHIPPED | OUTPATIENT
Start: 2024-02-14 | End: 2024-05-28 | Stop reason: SDUPTHER

## 2024-02-14 RX ORDER — LAMOTRIGINE 25 MG/1
50 TABLET ORAL DAILY
Qty: 60 TABLET | Refills: 1 | Status: SHIPPED | OUTPATIENT
Start: 2024-02-14

## 2024-02-14 NOTE — PROGRESS NOTES
"Subjective   Patient ID: Carli Wang is a 28 y.o. female who presents for Anxiety, Bipolar, OCD (Obsessive-Compulsive Disorder), Panic Attack, Depression, and Med Management Last visit with this writer on 1/19/23.     HPI  Patient arrived on-time for virtual visit. Calm and cooperative on camera. Since last visit patient was able to begin Spravato treatment @ [facility]. Receiving injection twice per week - completed 3 treatments total. \"Significant\" improvements reported since beginning. Panic symptoms, depressive symptoms, overall anxiety and feelings of \"dread\" improving. Denies any side effects and has scheduled injections in the evenings to not disrupt work. Avoiding Ativan completely on days when receiving Esketamine. Patient feels she is confident with care being received at [facility]. Has returned to work full-time and spoke with Supervisor who reports that her work load has consistently been too high and since has been adjusted. Functioning well at work which patient reports is \"not even a stressor\". Patient reports \"started doing a lot more stuff I used to do\" - getting back to cooking and grocery shopping on a consistent routine. Resuming exercise and social interactions. Resuming activities with youth ministry. Continues to see established counselor Dr. Starks last visit on 1/5/24 (note reviewed). In [facility] care at [facility] which is going well - considering different support groups post aftercare. Appetite and nausea improving. Delta 8 use improving - x1 most days but not every day.     -- Depression --   Mood: 5-6/10   0=lowest mood, 10=great mood   Improving anhedonia - increasing interest in hobbies (cooking, exercise, arts/crafts)   Appetite improving, 1.5-3 meals per day   Sleep: 11 hours/night   - no nightmares  - sleeping in bed   - improving evening anxiety   Energy levels: \"good\", improving   Focus/concentration: focused during visit, improving   Hopelessness: endorses " "    Safety  + passive SI, improving in frequency - \"theres been a decreased\". No Plan or intent   NO NSSIB since last visit     Patient is confident she can continue to keep herself safe and is accepting of safety resources. Patient does agree to continue relying on her support system and agrees to call 911 or report to the ER if SI w/ a plan or intent was recognized. Discussed intake process at [facility] vs [facility] if needed.      Bipolar ROS   No john/hypomania since last visit   + hx of BPII which patient disagrees with      AVH - denies  No delusional thinking noted   Does not appear internally stimulated   Patient does endorse dissociative episode x1 at work. Not experienced before, not reoccurring      OCD  + nighttime/evening intrusive thoughts r/t death and safety of family members (not purely SI), decreasing in severity   - intermittent checking behaviors   - No skin picking/hair pulling      - anxiety -   + evening panic symptoms, present nightly but improving in severity   Restlessness/keyed up or on edge: endorses   Muscle tension: endorses, + muscle pain      Substance use   Alcohol: No alcohol use since last visit.   Denies tobacco use   Caffeine: denies - recently cut out caffeine   Marijuana (delta 8 OTC products): x1 most days in the afternoon, reducing   No illicit drug use reported      Safety:   Patient is moderate acute and moderate chronic risk of suicide. Static risk factors include female gender,  race and age 27. Dynamic risk factors include above psychiatric symptoms which we are addressing with medication. Protective factors include no previous attempts, rational thinking intact, good social support, help seeking, future oriented and no guns at home.    Objective   General Appearance: Well groomed, appropriate eye contact  Attitude/Behavior: Cooperative  Motor: No psychomotor agitation or retardation, no tremor or other abnormal movements  Speech: Normal rate, volume, " prosody  Gait/Station: WFL - Within functional limits  Mood: depressed, improving  Affect: Constricted  Thought Process: Linear, goal directed  Thought Associations: No loosening of associations  Thought Content: Normal  Perception: No perceptual abnormalities noted  Sensorium: Alert and oriented to person, place, time and situation  Insight: Intact  Judgement: Intact  Cognition: Cognitively intact to conversational testing with respect to attention, orientation, fund of knowledge, recent and remote memory, and language    Lab Review:   not applicable    Assessment/Plan   Problem List Items Addressed This Visit             ICD-10-CM    Anxiety F41.9    Bipolar affective disorder, current episode depressed (CMS/[facility]) F31.30    Depression F32.A    Cannabis use disorder, moderate, dependence (CMS/[facility]) F12.20    Panic disorder without agoraphobia with severe panic attacks F41.0    History of OCD (obsessive compulsive disorder) Z86.59     Provider impression/Recommendations  Carli Wang is a 27 year old female with a hx of  DM insipidus, anemia and hypothyroidism currently residing in [place], [place] with herself. She is employed full-time as a dietician traveling to local nursing homes.   Pphx: Bipolar type 2, OCD, anxiety w/ panic attacks, PTSD, Trichotillomania. Sought with multiple different providers in [facility] starting in childhood. Previously treated by Dr. Mao in [place] for 6 years before initiating care at  in 2023. Hospitalized for SI at  4/15/23-4/19/23 and completed adult IOP at  after. Started Evening IOP at [facility] fall of 2023.       DSM-5 diagnosis   Mood disorder, unspecified (h/o Bipolar 2)   Panic disorder    cannabis use d/o   NSSIB  OCD  h/o PTSD   h/o Trichotillomania   (R/o Cluster B traits)      current medications   Esketamine, twice per week @ [facility]   Lorazepam 1mg BID  - patient reduced dose to 1mg daily in the evenings.   - avoiding on Spravato days completely.  "  Reviewed OARRS on 2/14/24 - Ativan last filled on 2/2/24 for 60tablets/30days   Drug screen completed on 4/13/23 (during ER visit), + THC   Latuda 80mg daily   lamotrigine 250mg daily  Duloxetine 60mg daily   Good medication adherence   No EPS/TD reported or viewable on camera   No bothersome side effects or ADRs reported      - Significant mood improvements reported since starting Spravato. + Passive SI, improving. No NSSIB since last visit  - Patient reports she is resuming previously enjoyed hobbies and functioning well at work  - Panic attacks and anxiety symptoms improving  - OCD symptoms remain present   - Next visit scheduled with Dr. Mena in early March. Patient is aware this writer is leaving  March 2024  - Patient self reduced Ativan dose to 1mg daily.  Encouraged patient to maintain 1mg dose until next visit and that if she wishes to reduce further to reduce to 0.5mg daily (1/2 tablet) but avoid an abrupt stoppage of Ativan d/t concerns for potential withdrawal or rebound anxiety.   - c/w LTG 250mg daily   - c/w Duloxetine 60mg daily   - c/w Latuda 80mg daily.   - encouraged to continue treatment with at [facility]. This writer attempted to contact [facility] provider multiple times but unable to connect.   - repeat drug screen ordered, not completed   - patient is agreeable to plan detailed above.      Past Medication Trials:   \"trialed every single anti-depressants but I don't have a list\"   Lithium - last taken 3-4 years ago. + DM insipidus   Risperdal: ineffective, + panic attacks.   \"a patch\" w/ Dr. Mao ([facility]?)   Depakote - stopped in December, + weight gain  Seroquel - stopped during IOP   Abilify - unsure of response   Hydroxyzine - not helpful   clonidine - not helpful   BuSpar - \"tried that like three different times\"   Clonidine - not helpful   Propranolol - dizziness  Wellbutrin - short term trial, unsure of response   Remeron - stopped d/t worsening mood   Gabapentin - not " helpful      Start time 4:02pm   End time 4:21pm   Prep/charting time: 5min  Total time 24min     sister Jess [phone]. Okay to call if needed per patient

## 2024-02-16 ENCOUNTER — APPOINTMENT (OUTPATIENT)
Dept: BEHAVIORAL HEALTH | Facility: CLINIC | Age: 29
End: 2024-02-16
Payer: COMMERCIAL

## 2024-02-16 NOTE — PATIENT INSTRUCTIONS
Continue Duloxetine 60mg daily   Continue Latuda 80mg daily   Continue Lamotrigine 250mg daily   Continue Ativan 1mg daily   Continue Treatment at [facility]

## 2024-02-17 ENCOUNTER — APPOINTMENT (OUTPATIENT)
Dept: BEHAVIORAL HEALTH | Facility: CLINIC | Age: 29
End: 2024-02-17
Payer: COMMERCIAL

## 2024-02-23 ENCOUNTER — TELEMEDICINE (OUTPATIENT)
Dept: BEHAVIORAL HEALTH | Facility: CLINIC | Age: 29
End: 2024-02-23
Payer: COMMERCIAL

## 2024-02-23 DIAGNOSIS — F41.9 ANXIETY: ICD-10-CM

## 2024-02-23 DIAGNOSIS — F31.81 BIPOLAR 2 DISORDER (MULTI): ICD-10-CM

## 2024-02-23 PROCEDURE — 90837 PSYTX W PT 60 MINUTES: CPT | Performed by: PSYCHOLOGIST

## 2024-02-23 PROCEDURE — 1036F TOBACCO NON-USER: CPT | Performed by: PSYCHOLOGIST

## 2024-02-24 NOTE — PROGRESS NOTES
"6PM 36168 Indiv Psych for BP II and Anxiety (60 MIN, 605-700)  Virtual. Supportive Therapy. Not seen in 3 weeks. Has started ketamine/Spravato treatment, finished third week, getting Mon and Friday. So far, limited SE's. Feels high during tx but symptoms remit within a few hours. Feels duloxetine increase is also helping. Noted she's doing more--cooking, exercise, getting out, things \"easier\" but still has \"evening anxiety\", not every night but routinely. Still smoking cannabis but less (1-2 hits/night-delta 8 pen), \"needing less ativan\", now taking 1 mg from 2 mg/day. However, wonders if she's been tapering too quickly, possibly getting rebound effect with drastic cut. Not supposed to have ativan before ketamine tx's. Said she can concentrate better. Old pharmacologist leaving practice, starts with Dr. Mena early March, 2024. Has gone out socially. Next: 2 weeks.    "

## 2024-03-06 ENCOUNTER — TELEMEDICINE (OUTPATIENT)
Dept: BEHAVIORAL HEALTH | Facility: CLINIC | Age: 29
End: 2024-03-06
Payer: COMMERCIAL

## 2024-03-06 DIAGNOSIS — F31.30 BIPOLAR AFFECTIVE DISORDER, CURRENT EPISODE DEPRESSED, CURRENT EPISODE SEVERITY UNSPECIFIED (MULTI): ICD-10-CM

## 2024-03-06 DIAGNOSIS — F31.81 BIPOLAR 2 DISORDER (MULTI): ICD-10-CM

## 2024-03-06 DIAGNOSIS — F41.9 ANXIETY: ICD-10-CM

## 2024-03-06 DIAGNOSIS — F32.A DEPRESSION, UNSPECIFIED DEPRESSION TYPE: ICD-10-CM

## 2024-03-06 PROCEDURE — 1036F TOBACCO NON-USER: CPT | Performed by: PSYCHIATRY & NEUROLOGY

## 2024-03-06 PROCEDURE — 99215 OFFICE O/P EST HI 40 MIN: CPT | Performed by: PSYCHIATRY & NEUROLOGY

## 2024-03-06 RX ORDER — LORAZEPAM 1 MG/1
TABLET ORAL
Qty: 60 TABLET | Refills: 1 | Status: SHIPPED | OUTPATIENT
Start: 2024-03-06 | End: 2024-04-12 | Stop reason: SDUPTHER

## 2024-03-06 RX ORDER — DULOXETIN HYDROCHLORIDE 60 MG/1
60 CAPSULE, DELAYED RELEASE ORAL 2 TIMES DAILY
Qty: 60 CAPSULE | Refills: 2 | Status: SHIPPED | OUTPATIENT
Start: 2024-03-06 | End: 2024-04-05

## 2024-03-06 RX ORDER — LURASIDONE HYDROCHLORIDE 40 MG/1
40 TABLET, FILM COATED ORAL
Qty: 30 TABLET | Refills: 2 | Status: SHIPPED | OUTPATIENT
Start: 2024-03-06 | End: 2024-05-29 | Stop reason: ALTCHOICE

## 2024-03-06 ASSESSMENT — ENCOUNTER SYMPTOMS: NERVOUS/ANXIOUS: 1

## 2024-03-06 NOTE — PROGRESS NOTES
"Subjective   Patient ID: Carli Wang is a 28 y.o. female who presents for transfer of care by luis robert.  Met with Mr. Jay prior to this appointment to transfer care.  HPI patient has a history of depression \"bipolar\" and anxiety disorder who has recently started ketamine treatments.  In the past month she has gone from twice a week ketamine down to once a week.  There has been a dramatic improvement in her mood.  However anxiety remains a real problem especially in the evening.  She describes it as free-floating anxiety that seems to have no trigger, temporarily worse due to stopping use of delta 8 marijuana.  Reviewed medications at length with patient.  The bipolar diagnosis is in question, the patient questions the utility of Latuda.    Review of Systems   Psychiatric/Behavioral:  The patient is nervous/anxious.         Evening anxiety very bad       Objective   Physical Exam  Psychiatric:         Attention and Perception: Attention and perception normal.         Mood and Affect: Affect normal. Mood is anxious.         Speech: Speech normal.         Behavior: Behavior normal. Behavior is cooperative.         Thought Content: Thought content normal.         Cognition and Memory: Cognition and memory normal.         Judgment: Judgment normal.         Assessment/Plan   Problem List Items Addressed This Visit             ICD-10-CM    Anxiety F41.9     Increase Cymbalta to 60 mg a.m. and 60 mg at around 3 to 4 PM.  See if this has an effect on the evening anxiety.  Okay to use Ativan 1 to 2 mg total daily control anxiety in the evenings especially as she is getting off delta 8.  Continue ketamine treatments.         Relevant Medications    LORazepam (Ativan) 1 mg tablet    Bipolar affective disorder, current episode depressed (CMS/[facility]) F31.30    Depression F32.A     Decrease Latuda to 40 mg daily as it does not seem patient has a history of hypomania or john.  Also Latuda might be causing akathisia " adding to her nighttime anxiety.  Continue Lamictal 250 mg daily.  Follow-up 4 weeks         Relevant Medications    DULoxetine (Cymbalta) 60 mg DR capsule    lurasidone (Latuda) 40 mg tablet     Other Visit Diagnoses         Codes    Bipolar 2 disorder (CMS/[facility])     F31.81    Relevant Medications    LORazepam (Ativan) 1 mg tablet                 Rashid Mena MD 03/06/24 3:50 PM

## 2024-03-06 NOTE — ASSESSMENT & PLAN NOTE
Decrease Latuda to 40 mg daily as it does not seem patient has a history of hypomania or john.  Also Latuda might be causing akathisia adding to her nighttime anxiety.  Continue Lamictal 250 mg daily.  Follow-up 4 weeks

## 2024-03-06 NOTE — ASSESSMENT & PLAN NOTE
Increase Cymbalta to 60 mg a.m. and 60 mg at around 3 to 4 PM.  See if this has an effect on the evening anxiety.  Okay to use Ativan 1 to 2 mg total daily control anxiety in the evenings especially as she is getting off delta 8.  Continue ketamine treatments.   DISPLAY PLAN FREE TEXT

## 2024-03-07 ENCOUNTER — OFFICE VISIT (OUTPATIENT)
Dept: BEHAVIORAL HEALTH | Facility: CLINIC | Age: 29
End: 2024-03-07
Payer: COMMERCIAL

## 2024-03-07 DIAGNOSIS — F41.9 ANXIETY: ICD-10-CM

## 2024-03-07 DIAGNOSIS — F31.81 BIPOLAR 2 DISORDER (MULTI): ICD-10-CM

## 2024-03-07 PROCEDURE — 90834 PSYTX W PT 45 MINUTES: CPT | Performed by: PSYCHOLOGIST

## 2024-03-07 PROCEDURE — 1036F TOBACCO NON-USER: CPT | Performed by: PSYCHOLOGIST

## 2024-03-07 NOTE — PROGRESS NOTES
"6PM 38353 Indiv Psych for BP II and Anxiety (45 MIN, 410-500)  Virtual. Supportive Therapy. Saw Dr. Mena, first meeting went well. Adding another duloxetine dose in the evening, titrating off latuda (80 to 40 mg/day). Also, will continue on ativan. Noted she's had routine \"dread attacks\" (throat feels like it's closing/restriction and labored breathing) at night. To point of looking to self-harm last week but didn't. Still feels a lot better overall with ketamine treatments, started going down to once weekly this week which will continue for 4 weeks then re-evaluate. Still participating in aftercare group at [facility]. Went on date the other night, ok time but no second date. Felt good she challenged herself to go. Still suing Delta 8 daily/evening. Working full-time, feeling more functional.  Next: 2-3 weeks.   "

## 2024-03-21 ENCOUNTER — APPOINTMENT (OUTPATIENT)
Dept: ALLERGY | Facility: CLINIC | Age: 29
End: 2024-03-21
Payer: COMMERCIAL

## 2024-03-23 ENCOUNTER — TELEMEDICINE (OUTPATIENT)
Dept: BEHAVIORAL HEALTH | Facility: CLINIC | Age: 29
End: 2024-03-23
Payer: COMMERCIAL

## 2024-03-23 DIAGNOSIS — F31.81 BIPOLAR II DISORDER, MILD, DEPRESSED, WITH ANXIOUS DISTRESS (MULTI): ICD-10-CM

## 2024-03-23 PROCEDURE — 1036F TOBACCO NON-USER: CPT | Performed by: PSYCHOLOGIST

## 2024-03-23 PROCEDURE — 90837 PSYTX W PT 60 MINUTES: CPT | Performed by: PSYCHOLOGIST

## 2024-03-23 NOTE — PROGRESS NOTES
"12PM 70066 Indiv Psych for BP II and Anxiety (60 MIN, 1202-100)  Virtual. Supportive Therapy. No \"dread\" attacks the past two weeks. Attributing to take medication, spravada, also smoking cannabis, daily again. Overall reported feeling better, more functional. Said she's down on ativan use. Now ketamine tx will be one time every few weeks as part of maintenance. Encouraged her to get back on Dr. Mena's schedule. More active fantasy life, some concerns about getting too caught up in this. Noted long-standing history of this. Said yesterday at work only worked an hour as a result. Discussed catching self, using reframing thoughts to re-engage task at hand. Not using delta 8 but using cannabis. Discontinued latuda a few weeks ago. Got review at work, not good but expected this. Boss did say she's seen improvement and some of areas pointed out in performance review no longer apply. Wants to get some distance from parents who've been supporting her through treatment. Running some. Discussed importance of trying to maintain, not starting too many goals. 2 more aftercare sessions at [facility]. Hopes to find a support group. Next: 2 weeks.   "

## 2024-04-05 ENCOUNTER — OFFICE VISIT (OUTPATIENT)
Dept: BEHAVIORAL HEALTH | Facility: CLINIC | Age: 29
End: 2024-04-05
Payer: COMMERCIAL

## 2024-04-05 DIAGNOSIS — F31.81 BIPOLAR 2 DISORDER (MULTI): ICD-10-CM

## 2024-04-05 DIAGNOSIS — F32.A DEPRESSION, UNSPECIFIED DEPRESSION TYPE: ICD-10-CM

## 2024-04-05 DIAGNOSIS — F41.9 ANXIETY: ICD-10-CM

## 2024-04-05 PROCEDURE — 90837 PSYTX W PT 60 MINUTES: CPT | Performed by: PSYCHOLOGIST

## 2024-04-05 RX ORDER — DULOXETIN HYDROCHLORIDE 60 MG/1
60 CAPSULE, DELAYED RELEASE ORAL 2 TIMES DAILY
Qty: 180 CAPSULE | Refills: 1 | Status: SHIPPED | OUTPATIENT
Start: 2024-04-05 | End: 2024-10-02

## 2024-04-07 NOTE — PROGRESS NOTES
"4 PM 81589 Indiv Psych for BP II and Anxiety (60 MIN, 285-657)  Virtual. Supportive Therapy. ketamine spray treatments now reduced to one time every few weeks. Noticing appetite is down, more body pain. Is going to see how it goes over next several weeks with the treatment at that interval. Nights better overall, less fear/dread. \"A lot more intrusive thoughts.\" Had last IOP aftercare session at [facility]. May look for support group. Discussed her being bothered again about being single. Considering trying a [employer] social mixers group. Now nervous about running into old bfCholo. \"Freaking about age\", 28. Feeling \"behind\", dating, having kids. Smoking more cannabis. Spent time discussing hair pulling/picking and some behavioral strategies to reduce. Also, considering discussing with dermatologist. Next: 2 weeks.   "

## 2024-04-08 ENCOUNTER — APPOINTMENT (OUTPATIENT)
Dept: ENDOCRINOLOGY | Facility: CLINIC | Age: 29
End: 2024-04-08
Payer: COMMERCIAL

## 2024-04-11 ENCOUNTER — PATIENT MESSAGE (OUTPATIENT)
Dept: BEHAVIORAL HEALTH | Facility: CLINIC | Age: 29
End: 2024-04-11
Payer: COMMERCIAL

## 2024-04-11 DIAGNOSIS — F41.9 ANXIETY: ICD-10-CM

## 2024-04-11 DIAGNOSIS — F31.81 BIPOLAR 2 DISORDER (MULTI): ICD-10-CM

## 2024-04-12 RX ORDER — LORAZEPAM 1 MG/1
TABLET ORAL
Qty: 60 TABLET | Refills: 1 | Status: SHIPPED | OUTPATIENT
Start: 2024-04-12 | End: 2024-05-29 | Stop reason: SDUPTHER

## 2024-04-19 ENCOUNTER — APPOINTMENT (OUTPATIENT)
Dept: BEHAVIORAL HEALTH | Facility: CLINIC | Age: 29
End: 2024-04-19
Payer: COMMERCIAL

## 2024-04-27 ENCOUNTER — TELEMEDICINE (OUTPATIENT)
Dept: BEHAVIORAL HEALTH | Facility: CLINIC | Age: 29
End: 2024-04-27
Payer: COMMERCIAL

## 2024-04-27 DIAGNOSIS — F41.9 ANXIETY: ICD-10-CM

## 2024-04-27 DIAGNOSIS — F31.81 BIPOLAR II DISORDER, CURRENT EPISODE DEPRESSED (MULTI): ICD-10-CM

## 2024-04-27 PROCEDURE — 90837 PSYTX W PT 60 MINUTES: CPT | Performed by: PSYCHOLOGIST

## 2024-04-28 NOTE — PROGRESS NOTES
1 PM 91106 Indiv Psych for BP II and Anxiety (60 MIN, 105-204)  Virtual. Supportive Therapy. Back to weekly [place]ilton, wasn't doing as well with every few weeks. Will try this weekly for 6 weeks. Able to take uber to/from, not relying on parents. Taking more ativan and smoking more cannabis. Less nighttime dread. Exercising routinely. Difficulty with anxiety in morning, wants to get up earlier and have some down time to read, etc. Avoiding getting on computer for CEU site. Work going ok. Discussed event at work, was offered to move in to office space with friend/PT and friend said no, mentioning it might be an issue with her boss. Patient disappointed, took personally, has been distancing. May or may not say something. Was to meet colleagues' friends but declined because of event. 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 Fewer intrusive thoughts about cat dying. Parents going to [place] for 3 weeks. Appetite down again, has to push to eat. Next: 2 weeks.

## 2024-05-10 ENCOUNTER — OFFICE VISIT (OUTPATIENT)
Dept: BEHAVIORAL HEALTH | Facility: CLINIC | Age: 29
End: 2024-05-10
Payer: COMMERCIAL

## 2024-05-10 DIAGNOSIS — F41.9 ANXIETY: ICD-10-CM

## 2024-05-10 DIAGNOSIS — F31.81 BIPOLAR 2 DISORDER (MULTI): ICD-10-CM

## 2024-05-10 PROCEDURE — 90837 PSYTX W PT 60 MINUTES: CPT | Performed by: PSYCHOLOGIST

## 2024-05-11 NOTE — PROGRESS NOTES
"4 PM 66767 Indiv Psych for BP II and Anxiety (60 MIN, 410-504)  Virtual. Supportive Therapy. Not doing well, depressed and anxious. Last few ketamine treatments less effective. Had issues last Friday, \"crisis situation\", felt bad. Looking into [facility] where she's getting ketamine tx, canceled her appt for this past Monday because they kept calling her and she didn't get back to them, unaware who calls were from/unlisted number. At one point she had been frustrated and said she should have gone to  and wondered if they canceled her appt because of this. She was crying and overwhelmed at work. Ended up self-harming/cutting herself at home also hitting self in head. \"Felt abandoned.\" Finally able to talk to  who got back to her, was able to get back on schedule this past Wed and got treatment. Still feeling bad. May take some PTO next week. Taking ativan (1 mgX2/day) as prescribed daily and back to daily cannabis to help her cope. Said her brain hasn't been \"working\". Not getting a lot done at work. Parents leaving for [place] next week and hasn't let on to them she's doing poorly. Has talked to sister. Reduced energy, not feeling like eating. Presents as anxious/dysphoric/teary. Mornings feels worse. Not as bad in evenings. Still some skin picking/hair pulling but making efforts to reduce. Next: 2 weeks.   "

## 2024-05-20 ENCOUNTER — TELEMEDICINE (OUTPATIENT)
Dept: BEHAVIORAL HEALTH | Facility: CLINIC | Age: 29
End: 2024-05-20
Payer: COMMERCIAL

## 2024-05-20 DIAGNOSIS — F31.81 BIPOLAR II DISORDER, CURRENT EPISODE DEPRESSED (MULTI): ICD-10-CM

## 2024-05-20 DIAGNOSIS — F41.9 ANXIETY: ICD-10-CM

## 2024-05-20 PROCEDURE — 90834 PSYTX W PT 45 MINUTES: CPT | Performed by: PSYCHOLOGIST

## 2024-05-20 NOTE — PROGRESS NOTES
"1 PM 94333 Indiv Psych for BP II, Depressed and Anxiety (45 MIN, 101-150)  Virtual. Supportive Therapy. Not doing well, Patient reached out for emergent meeting. Continues to feel bad, more depressed and anxious. Reached out to Dr. Mena who indicated he's willing to continue at 3 mg ativan and offered seroquel as possible solution. Patient feeling desperate. Asked about need to go to hospital and she said no, no active plan to harm self. Encouraged her to give Dr. Mena more room to work, given they've only had one meeting, sees her again May 29. She questions if she can be helped feeling she's exhausted most meds. After positive response to ketamine twice a day she was cut back to once a day then began getting more symptomatic again. Has spravada treatment later this afternoon. Likely making her symptoms worse is the fact that she hasn't had cannabis in a a few days and likely having some withdrawal. Not interested in seroquel at this time, having gained 60 pounds on this in the past. Also, was binge eating while on seroquel and sleeping a lot. Able to get out for some short walks. Spent time with sister, a mental health therapist, who tells her she is not using her skills and also said she may need to see a psychoanalyst. Patient having harder time in mornings, anxious and emotional meltdowns at work. Some paranoia, going out, things people are looking at her. Discussed importance of staying in short-term for now. Day to day, looking at passive (e.g., tv, reading, [facility]\" podcast, crafts) to shift focus and more active interventions-behavioral, walking, chores, reframing). Said she's been taking 4 mg of ativan daily. Next: 4 days.   "

## 2024-05-22 ENCOUNTER — APPOINTMENT (OUTPATIENT)
Dept: BEHAVIORAL HEALTH | Facility: CLINIC | Age: 29
End: 2024-05-22
Payer: COMMERCIAL

## 2024-05-24 ENCOUNTER — APPOINTMENT (OUTPATIENT)
Dept: BEHAVIORAL HEALTH | Facility: CLINIC | Age: 29
End: 2024-05-24
Payer: COMMERCIAL

## 2024-05-25 ENCOUNTER — OFFICE VISIT (OUTPATIENT)
Dept: BEHAVIORAL HEALTH | Facility: CLINIC | Age: 29
End: 2024-05-25
Payer: COMMERCIAL

## 2024-05-25 DIAGNOSIS — F31.81 BIPOLAR II DISORDER, CURRENT EPISODE DEPRESSED (MULTI): ICD-10-CM

## 2024-05-25 DIAGNOSIS — F41.9 ANXIETY: ICD-10-CM

## 2024-05-25 PROCEDURE — 90837 PSYTX W PT 60 MINUTES: CPT | Performed by: PSYCHOLOGIST

## 2024-05-26 ENCOUNTER — PATIENT MESSAGE (OUTPATIENT)
Dept: BEHAVIORAL HEALTH | Facility: CLINIC | Age: 29
End: 2024-05-26
Payer: COMMERCIAL

## 2024-05-26 DIAGNOSIS — F31.81 BIPOLAR 2 DISORDER (MULTI): ICD-10-CM

## 2024-05-26 NOTE — PROGRESS NOTES
"4 PM 56556 Indiv Psych for BP II, Depressed and Anxiety (60 MIN, 405-500)  In-person.  Supportive Therapy. Tough week, depressed. Got Spravato nasal treatment both Monday and Friday. No immediate relief. Distressed, back to daily cannabis use, which helps. Reached out to uncle, a psychiatrist, to assess potential treatment options. Said one option is \"CA rocket fuel\", a combo of remeron and duloxetine. Also, recommendation consideration for MAOI patch. Wasn't optimistic about TMS. Patient is scheduled for first appt with Dr. Mena this week, anxious about this appointment. Was looking forward to going to [place] for [employer] trip, boss took PTO off table since State may come in for unannounced site inspection. Disappointed. Getting approval for Spravato for two times/week. Trying to stay with walking, exercise. Urges to self-harm (hx of cutting) but able to resist. Still difficulty eating unless she smokes more. \"Feel I'm in a lot of pain.\" Parents still in [place], sister more attentive, concerned. Anxiety higher. Discussed event with friend, friends with benefits, but got together and didn't work out, more stressful. Agreed to meet weekly for awhile. Next: 1 week.   "

## 2024-05-28 RX ORDER — LAMOTRIGINE 100 MG/1
200 TABLET ORAL DAILY
Qty: 60 TABLET | Refills: 3 | Status: SHIPPED | OUTPATIENT
Start: 2024-05-28

## 2024-05-29 ENCOUNTER — TELEMEDICINE (OUTPATIENT)
Dept: BEHAVIORAL HEALTH | Facility: CLINIC | Age: 29
End: 2024-05-29
Payer: COMMERCIAL

## 2024-05-29 DIAGNOSIS — F31.81 BIPOLAR 2 DISORDER (MULTI): ICD-10-CM

## 2024-05-29 DIAGNOSIS — F31.81 BIPOLAR II DISORDER, CURRENT EPISODE DEPRESSED (MULTI): ICD-10-CM

## 2024-05-29 DIAGNOSIS — F41.9 ANXIETY: ICD-10-CM

## 2024-05-29 PROCEDURE — 99213 OFFICE O/P EST LOW 20 MIN: CPT | Performed by: PSYCHIATRY & NEUROLOGY

## 2024-05-29 RX ORDER — BUPROPION HYDROCHLORIDE 150 MG/1
150 TABLET ORAL DAILY
Qty: 30 TABLET | Refills: 11 | Status: SHIPPED | OUTPATIENT
Start: 2024-05-29 | End: 2025-05-29

## 2024-05-29 RX ORDER — LORAZEPAM 1 MG/1
TABLET ORAL
Qty: 60 TABLET | Refills: 1 | Status: SHIPPED | OUTPATIENT
Start: 2024-05-29

## 2024-05-29 ASSESSMENT — ENCOUNTER SYMPTOMS
NERVOUS/ANXIOUS: 1
SLEEP DISTURBANCE: 0
DYSPHORIC MOOD: 1

## 2024-05-29 NOTE — ASSESSMENT & PLAN NOTE
Add Wellbutrin  mg in the morning.  Risk benefits and side effects discussed.  Inquire about TMS treatments at [facility].  Continue ketamine as frequently as possible at present twice a week.  Patient evidencing symptoms of borderline personality disorder will discuss with Magdaleno Starks.

## 2024-06-01 ENCOUNTER — OFFICE VISIT (OUTPATIENT)
Dept: BEHAVIORAL HEALTH | Facility: CLINIC | Age: 29
End: 2024-06-01
Payer: COMMERCIAL

## 2024-06-01 DIAGNOSIS — F31.81 BIPOLAR II DISORDER, MILD, DEPRESSED, WITH ANXIOUS DISTRESS (MULTI): ICD-10-CM

## 2024-06-01 PROCEDURE — 90837 PSYTX W PT 60 MINUTES: CPT | Performed by: PSYCHOLOGIST

## 2024-06-01 NOTE — PROGRESS NOTES
"2 PM 59212 Indiv Psych for BP II, Depressed and Anxiety (60 MIN, 206-303)  In-person.  Supportive Therapy. Anxious, depressed, restless. Started bupropion earlier in week following meeting with psychiatrist. Was out of lamotrigine for several days. Taking melatonin for sleep, slept better last night. Reduced ativan, still some cannabis use. Feels \"in pain, restless.\" Walking/exercising a lot to cope. One incident of self-harm. Trying to stay busy. She brought up BPD, discussed briefly. Session mainly supportive. Encouraged her to stay busy. Reported feeling more  \"dread\" but easier to manage during the day vs at night. Next; 2 weeks.   "

## 2024-06-05 ENCOUNTER — APPOINTMENT (OUTPATIENT)
Dept: BEHAVIORAL HEALTH | Facility: CLINIC | Age: 29
End: 2024-06-05
Payer: COMMERCIAL

## 2024-06-14 ENCOUNTER — APPOINTMENT (OUTPATIENT)
Dept: BEHAVIORAL HEALTH | Facility: CLINIC | Age: 29
End: 2024-06-14
Payer: COMMERCIAL

## 2024-06-21 ENCOUNTER — APPOINTMENT (OUTPATIENT)
Dept: BEHAVIORAL HEALTH | Facility: CLINIC | Age: 29
End: 2024-06-21
Payer: COMMERCIAL

## 2024-06-22 ENCOUNTER — TELEMEDICINE (OUTPATIENT)
Dept: BEHAVIORAL HEALTH | Facility: CLINIC | Age: 29
End: 2024-06-22
Payer: COMMERCIAL

## 2024-06-22 DIAGNOSIS — F31.81 BIPOLAR II DISORDER, CURRENT EPISODE DEPRESSED (MULTI): ICD-10-CM

## 2024-06-22 DIAGNOSIS — F41.9 ANXIETY: ICD-10-CM

## 2024-06-22 PROCEDURE — 90837 PSYTX W PT 60 MINUTES: CPT | Performed by: PSYCHOLOGIST
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
## 2024-07-05 ENCOUNTER — APPOINTMENT (OUTPATIENT)
Dept: BEHAVIORAL HEALTH | Facility: CLINIC | Age: 29
End: 2024-07-05
Payer: COMMERCIAL

## 2024-07-05 DIAGNOSIS — F31.81 BIPOLAR II DISORDER, CURRENT EPISODE DEPRESSED (MULTI): ICD-10-CM

## 2024-07-05 DIAGNOSIS — F41.9 ANXIETY: ICD-10-CM

## 2024-07-05 PROCEDURE — 90837 PSYTX W PT 60 MINUTES: CPT | Performed by: PSYCHOLOGIST

## 2024-07-06 NOTE — PROGRESS NOTES
"5 PM 60755 Indiv Psych for BP II, Depressed and Anxiety (60 MIN, 503-043)  In-person. Supportive/CBT. Was supposed to get spravada once a week, delayed because of a med/pharmacy issue, so has been two weeks since last treatment so will get Monday. Also, sees pharmacologist next week, second visit. Spent time today discussing her involvement with a man she knew through [employer] and friends. Man attends AA, some previous legal hx. Found this man really liked her in past but then got involved with Cholo, also hadn't felt chemistry with man in past but had gone out on a few dates. Started talking again weeks ago, and he ended up spending night. Planned to get together yesterday for a date and TwoF but he said he wasn't ready for a relationship. Patient disappointed, not wanting to over-react but has been ruminating about. Trying to keep some perspective on situation. Found through other friends he did this with previous romantic interest. Trip to [place] with friend to lake was good. Was less reliant on ativan, didn't smoke cannabis for a week. Lost 3 pounds b/c relies on cannabis to stimulate appetite. Less skin picking, but didn't yet schedule with dermatologist. Started smoking cannabis again when spravada tx delayed. Said she's getting \"brain zaps\", unclear if from medicine. Some paranoia when goes out or at times feeling neighbors looking at her. Reported she knows they're not and it's not rational. Still running/walking some. Wants to challenge self to get to running group tomorrow. Volume of work, especially since she went on vacation, is high, wants to get caught up this weekend. Has seemed calmer the past few meetings. Next; 2 weeks.    "

## 2024-07-08 ENCOUNTER — APPOINTMENT (OUTPATIENT)
Dept: BEHAVIORAL HEALTH | Facility: CLINIC | Age: 29
End: 2024-07-08
Payer: COMMERCIAL

## 2024-07-08 DIAGNOSIS — F31.81 BIPOLAR 2 DISORDER (MULTI): ICD-10-CM

## 2024-07-08 DIAGNOSIS — F31.81 BIPOLAR II DISORDER, CURRENT EPISODE DEPRESSED (MULTI): ICD-10-CM

## 2024-07-08 PROCEDURE — 99213 OFFICE O/P EST LOW 20 MIN: CPT | Performed by: PSYCHIATRY & NEUROLOGY

## 2024-07-08 RX ORDER — LAMOTRIGINE 100 MG/1
250 TABLET ORAL DAILY
Qty: 75 TABLET | Refills: 11 | Status: SHIPPED | OUTPATIENT
Start: 2024-07-08

## 2024-07-08 RX ORDER — BUPROPION HYDROCHLORIDE 300 MG/1
300 TABLET ORAL EVERY MORNING
Qty: 30 TABLET | Refills: 11 | Status: SHIPPED | OUTPATIENT
Start: 2024-07-08 | End: 2025-07-08

## 2024-07-08 ASSESSMENT — ENCOUNTER SYMPTOMS: PSYCHIATRIC NEGATIVE: 1

## 2024-07-08 NOTE — ASSESSMENT & PLAN NOTE
Ordered Lamictal 100 mg take 2-1/2 tabs daily.  Reordered Ativan.  Increase the Wellbutrin to 300 mg in the morning.  Continue getting ketamine sprays once a week.  Continue work with Dr. Starks and [facility].  Discussed traits of borderline personality

## 2024-07-08 NOTE — PROGRESS NOTES
"Subjective   Patient ID: Carli Wang is a 28 y.o. female who presents for med management visit..  HPI Virtual or Telephone Consent    An interactive audio and video telecommunication system which permits real time communications between the patient (at the originating site) and provider (at the distant site) was utilized to provide this telehealth service.   Verbal consent was requested and obtained from Carli Wang on this date, 07/08/24 for a telehealth visit.    Patient has been more weepy recently but she has had a delay in getting her ketamine treatment by nasal spray.  Ketamine has made a big difference in terms of her mood stability.  She starts to get more symptomatic after about 8 days of not having gotten a treatment.  Patient reports she is in a relatively stable place right now \"life is kind of okay\" talked about increasing Wellbutrin for residual depression.  Patient has found a DBT workbook and finds it extremely helpful.    Review of Systems   Psychiatric/Behavioral: Negative.  Negative for self-injury and suicidal ideas.        Objective   Physical Exam  Psychiatric:         Attention and Perception: Attention and perception normal.         Mood and Affect: Mood and affect normal.         Speech: Speech is delayed.         Behavior: Behavior is withdrawn.         Thought Content: Thought content normal.         Cognition and Memory: Cognition and memory normal.         Judgment: Judgment normal.      Comments: There is a long delay when answering questions.  May be technical glitch         Assessment/Plan   Problem List Items Addressed This Visit             ICD-10-CM    Bipolar 2 disorder (Multi) F31.81     Ordered Lamictal 100 mg take 2-1/2 tabs daily.  Reordered Ativan.  Increase the Wellbutrin to 300 mg in the morning.  Continue getting ketamine sprays once a week.  Continue work with Dr. Starks and DBT.  Discussed traits of borderline personality          Other Visit Diagnoses "         Codes    Bipolar II disorder, current episode depressed (Multi)     F31.81    Relevant Medications    lamoTRIgine (LaMICtal) 100 mg tablet    buPROPion XL (Wellbutrin XL) 300 mg 24 hr tablet                 Rashid Mena MD 07/08/24 7:37 PM

## 2024-07-18 ENCOUNTER — PATIENT MESSAGE (OUTPATIENT)
Dept: BEHAVIORAL HEALTH | Facility: CLINIC | Age: 29
End: 2024-07-18
Payer: COMMERCIAL

## 2024-07-18 DIAGNOSIS — F31.81 BIPOLAR II DISORDER, CURRENT EPISODE DEPRESSED (MULTI): ICD-10-CM

## 2024-07-18 DIAGNOSIS — F31.81 BIPOLAR 2 DISORDER (MULTI): ICD-10-CM

## 2024-07-18 RX ORDER — LITHIUM CARBONATE 450 MG/1
450 TABLET ORAL DAILY
Qty: 30 TABLET | Refills: 0 | Status: SHIPPED | OUTPATIENT
Start: 2024-07-18 | End: 2024-08-17

## 2024-07-19 ENCOUNTER — APPOINTMENT (OUTPATIENT)
Dept: BEHAVIORAL HEALTH | Facility: CLINIC | Age: 29
End: 2024-07-19
Payer: COMMERCIAL

## 2024-07-20 ENCOUNTER — APPOINTMENT (OUTPATIENT)
Dept: BEHAVIORAL HEALTH | Facility: CLINIC | Age: 29
End: 2024-07-20
Payer: COMMERCIAL

## 2024-07-20 DIAGNOSIS — F31.81: ICD-10-CM

## 2024-07-20 PROCEDURE — 90837 PSYTX W PT 60 MINUTES: CPT | Performed by: PSYCHOLOGIST

## 2024-07-21 NOTE — PROGRESS NOTES
"2 PM 14072 Indiv Psych for BP II, Depressed and Anxiety (60 MIN, 205-302)  In-person. Supportive/CBT. Not doing well, increased depression, increased anxiety and increased irritability. Anxious about going out, being watched. Had an appointment with psychiatrist who scheduled her out three months. Because she's not been doing well, was able to get sooner appointment in a month. Trying to use her DBT skills (e.g., opposite action) and continues to get ketamine treatment once per month but not as helpful as when initially started. Has been using ativan twice per day, increased cannabis use. Has been thinking a lot about death and has resisted urge to self-harm. \"A lot of pain...intense emptiness...constant hopelessness..no meaning.\" Trying to stay with exercise, signed up for a half marathon Oct 12, will give her a focus for training. Work, increased volume, going home at times to rest. Cousin coming into town on way to [place], looking forward to seeing cousin and . Discussed relationship rift with Ortiz, man who's in AA. Trying to let it go. Looking forward to getting back to fall schedule with [employer], peer support. Next: 2 weeks.    "

## 2024-08-03 ENCOUNTER — APPOINTMENT (OUTPATIENT)
Dept: BEHAVIORAL HEALTH | Facility: CLINIC | Age: 29
End: 2024-08-03
Payer: COMMERCIAL

## 2024-08-03 DIAGNOSIS — F41.9 ANXIETY: ICD-10-CM

## 2024-08-03 DIAGNOSIS — F31.81 BIPOLAR 2 DISORDER (MULTI): ICD-10-CM

## 2024-08-03 PROCEDURE — 90837 PSYTX W PT 60 MINUTES: CPT | Performed by: PSYCHOLOGIST

## 2024-08-03 NOTE — PROGRESS NOTES
2 PM 52499 Indiv Psych for BP II, Depressed and Anxiety (60 MIN, 200-300)  In-person. Supportive/CBT. Got last ketamine tx Wed. Over the past five days, feeling better, used 2 mg of ativan, smoked cannabis daily but overall more stability.  Wants to try some other things before trying LI, mentioned during last psychiatrist meeting. Out socially all day recently, good day. She asked about BPD dx reviewed criteria and endorse many of symptoms. Discussed overlap with bipolar dx and some individuals have both. Getting to work daily. More optimistic. Not exercising as much. Plans on half-marathon in Oct. Going to [employer] dating event this next week. Next: 2 weeks.

## 2024-08-11 DIAGNOSIS — F31.81 BIPOLAR 2 DISORDER (MULTI): ICD-10-CM

## 2024-08-12 RX ORDER — LITHIUM CARBONATE 450 MG/1
450 TABLET ORAL DAILY
Qty: 90 TABLET | Refills: 1 | OUTPATIENT
Start: 2024-08-12 | End: 2024-09-11

## 2024-08-16 ENCOUNTER — APPOINTMENT (OUTPATIENT)
Dept: BEHAVIORAL HEALTH | Facility: CLINIC | Age: 29
End: 2024-08-16
Payer: COMMERCIAL

## 2024-08-17 ENCOUNTER — APPOINTMENT (OUTPATIENT)
Dept: BEHAVIORAL HEALTH | Facility: CLINIC | Age: 29
End: 2024-08-17
Payer: COMMERCIAL

## 2024-08-17 DIAGNOSIS — F41.9 ANXIETY: ICD-10-CM

## 2024-08-17 DIAGNOSIS — F31.81 BIPOLAR II DISORDER, CURRENT EPISODE DEPRESSED (MULTI): ICD-10-CM

## 2024-08-17 PROCEDURE — 90837 PSYTX W PT 60 MINUTES: CPT | Performed by: PSYCHOLOGIST
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
## 2024-08-31 ENCOUNTER — APPOINTMENT (OUTPATIENT)
Dept: BEHAVIORAL HEALTH | Facility: CLINIC | Age: 29
End: 2024-08-31
Payer: COMMERCIAL

## 2024-08-31 DIAGNOSIS — F41.9 ANXIETY: ICD-10-CM

## 2024-08-31 DIAGNOSIS — F31.81 BIPOLAR 2 DISORDER (MULTI): ICD-10-CM

## 2024-08-31 PROCEDURE — 90837 PSYTX W PT 60 MINUTES: CPT | Performed by: PSYCHOLOGIST
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
## 2024-09-11 ENCOUNTER — PATIENT OUTREACH (OUTPATIENT)
Dept: PRIMARY CARE | Facility: CLINIC | Age: 29
End: 2024-09-11
Payer: COMMERCIAL

## 2024-09-11 RX ORDER — ESKETAMINE HYDROCHLORIDE 28 MG/.2ML
84 SOLUTION NASAL
COMMUNITY
Start: 2024-08-28

## 2024-09-11 RX ORDER — CLONAZEPAM 0.5 MG/1
1 TABLET ORAL EVERY 12 HOURS PRN
COMMUNITY
Start: 2024-08-12

## 2024-09-11 RX ORDER — ONDANSETRON 4 MG/1
4 TABLET, FILM COATED ORAL EVERY 8 HOURS PRN
COMMUNITY
Start: 2024-09-09

## 2024-09-11 NOTE — PROGRESS NOTES
Discharge Facility: [facility]   Discharge Diagnosis: Cellulitis of groin   Admission Date: 9-7-24  Discharge Date:  9-9-24    PCP Appointment Date: Pt. To call to schedule she does not have her schedule available at this time.   Specialist Appointment Date: 9-28-24   Hospital Encounter and Summary Linked: Yes  See discharge assessment below for further details  Medications  Medications reviewed with patient/caregiver?: Yes (9/11/2024 10:02 AM)  Is the patient having any side effects they believe may be caused by any medication additions or changes?: No (9/11/2024 10:02 AM)  Does the patient have all medications ordered at discharge?: Yes (9/11/2024 10:02 AM)  Care Management Interventions: No intervention needed (9/11/2024 10:02 AM)  Is the patient taking all medications as directed (includes completed medication regime)?: Yes (9/11/2024 10:02 AM)  Care Management Interventions: Provided patient education (9/11/2024 10:02 AM)    Appointments  Does the patient have a primary care provider?: Yes (9/11/2024 10:02 AM)  Care Management Interventions: Advised patient to make appointment (9/11/2024 10:02 AM)  Has the patient kept scheduled appointments due by today?: Yes (9/11/2024 10:02 AM)  Care Management Interventions: Advised patient to keep appointment (9/11/2024 10:02 AM)    Self Management  Has home health visited the patient within 72 hours of discharge?: Not applicable (9/11/2024 10:02 AM)    Patient Teaching  Does the patient have access to their discharge instructions?: Yes (9/11/2024 10:02 AM)  Care Management Interventions: Reviewed instructions with patient (9/11/2024 10:02 AM)  What is the patient's perception of their health status since discharge?: Improving (9/11/2024 10:02 AM)  Patient/Caregiver Education Comments: Pt. states she feels well, just very, very tired. (9/11/2024 10:02 AM)      Carol Box LPN

## 2024-09-23 ENCOUNTER — OFFICE VISIT (OUTPATIENT)
Dept: BEHAVIORAL HEALTH | Facility: CLINIC | Age: 29
End: 2024-09-23
Payer: COMMERCIAL

## 2024-09-23 DIAGNOSIS — F31.81 BIPOLAR II DISORDER, CURRENT EPISODE DEPRESSED (MULTI): ICD-10-CM

## 2024-09-23 DIAGNOSIS — F41.9 ANXIETY: ICD-10-CM

## 2024-09-23 PROCEDURE — 90837 PSYTX W PT 60 MINUTES: CPT | Performed by: PSYCHOLOGIST

## 2024-09-24 NOTE — PROGRESS NOTES
"5PM 93551 Indiv Psych for BP II, Depressed and Anxiety (60 MIN, 896-761)  In-person. Supportive/CBT.  Patient has not been doing well over the past several days.  Was hospitalized for 3 days because of an infected cyst and cellulitis.  Reported that cellulitis turned into MRSA.  Additionally she has not been sleeping well, noted to 6 hours of sleep over 3-day.  Mood has been depressed and anxious.  Additionally after getting out of the hospital the person that she had started dating expressed that he was no longer interested in continuing.  Some of the IV antibiotics and medications while in the hospital and additionally still taking steroids may be contributing to mood instability.  She will be off the steroids soon.  Reported that she is having some emotional meltdowns.  It has been difficult to go to work.  Also was in significant pain which is slowly improving.  Was told in the hospital that her anxiety was a problem but she also reported brain zaps and believes that she may have not been getting the right dose of her psychiatric medication.  For the days that she was not sleeping she was also not eating.  She reached out over the weekend and indicates she is not doing well.  As result we are meeting today.  Additionally, we have a meeting scheduled for this coming weekend.  She has been back to work and continues to have problems at 1 facility.  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  She has spoken with HR about this person.  She is been using cannabis daily.  She has been taking her Klonopin but reported it has been less effective.  Trying to keep the relationship break-up in perspective.  That only gone a few days.  She is also not been exercising the past few weeks because of pain and how she has been feeling.  Ported that she is also getting the \"dreads\" in the evenings again.  Continues with that once per week as provide a " "treatments.  \"Tatian's do not know how to get over the meltdowns.  So dramatic.\"  Thinking of going to [facility] to visit a friend in October. Next: 5 days.  "

## 2024-09-25 ENCOUNTER — PATIENT OUTREACH (OUTPATIENT)
Dept: PRIMARY CARE | Facility: CLINIC | Age: 29
End: 2024-09-25
Payer: COMMERCIAL

## 2024-09-25 NOTE — PROGRESS NOTES
Unable to reach patient for call back after recent hospitalization.   LVM with call back number for patient to call if needed   If no voicemail available call attempts x 2 were made to contact the patient to assist with any questions or concerns patient may have.    Carol Box LPN

## 2024-09-28 ENCOUNTER — APPOINTMENT (OUTPATIENT)
Dept: BEHAVIORAL HEALTH | Facility: CLINIC | Age: 29
End: 2024-09-28
Payer: COMMERCIAL

## 2024-09-28 DIAGNOSIS — F41.9 ANXIETY: ICD-10-CM

## 2024-09-28 DIAGNOSIS — F31.81 BIPOLAR II DISORDER, CURRENT EPISODE DEPRESSED (MULTI): ICD-10-CM

## 2024-09-28 PROCEDURE — 90837 PSYTX W PT 60 MINUTES: CPT | Performed by: PSYCHOLOGIST

## 2024-09-28 NOTE — PROGRESS NOTES
"10AM 16254 Indiv Psych for BP II, Depressed and Anxiety (60 MIN, 5127-3104)  In-person. Supportive/CBT.  Patient feeling a little bit better.   She talked to her physicians assistant who indicated that the mood stuff is likely related to the antibiotics.  She is no longer taking them has began feeling better.  Will touch base with him next week.  She had a ketamine treatment yesterday.   she reported that her appetite is improved.  She has been walking.  Continues to take her medication consistently.  Discussed stress at work.  She will be meeting with the HR person this next week regarding observations of .  His last bother by the dating relationship not working out.  Has not been thinking about that so much.  Would like to get back to running.  Is planning some trips over the next month.  Will go to [place] 8-week again of October 12, then go to [place], [place] October 17, and to see grandmother in [place] in November.  Earlier in the week she went to see a  by herself and felt okay.  \"Felt so alone.\"  Observed several men there who had taken their partners.  Hopes to do some crafting in preparation for show she and her mother will be due on November 4.  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  Has had some exposure but has not done this in application. Next: 2 weeks.  "

## 2024-10-08 ENCOUNTER — APPOINTMENT (OUTPATIENT)
Dept: BEHAVIORAL HEALTH | Facility: CLINIC | Age: 29
End: 2024-10-08
Payer: COMMERCIAL

## 2024-10-15 DIAGNOSIS — F32.A DEPRESSION, UNSPECIFIED DEPRESSION TYPE: ICD-10-CM

## 2024-10-15 RX ORDER — DULOXETIN HYDROCHLORIDE 60 MG/1
60 CAPSULE, DELAYED RELEASE ORAL 2 TIMES DAILY
Qty: 180 CAPSULE | Refills: 1 | Status: SHIPPED | OUTPATIENT
Start: 2024-10-15 | End: 2025-04-13

## 2024-10-26 ENCOUNTER — APPOINTMENT (OUTPATIENT)
Dept: BEHAVIORAL HEALTH | Facility: CLINIC | Age: 29
End: 2024-10-26
Payer: COMMERCIAL

## 2024-10-26 DIAGNOSIS — F41.9 ANXIETY: ICD-10-CM

## 2024-10-26 DIAGNOSIS — F31.81 BIPOLAR II DISORDER, CURRENT EPISODE DEPRESSED (MULTI): ICD-10-CM

## 2024-10-26 PROCEDURE — 90837 PSYTX W PT 60 MINUTES: CPT | Performed by: PSYCHOLOGIST

## 2024-11-09 ENCOUNTER — APPOINTMENT (OUTPATIENT)
Dept: BEHAVIORAL HEALTH | Facility: CLINIC | Age: 29
End: 2024-11-09
Payer: COMMERCIAL

## 2024-11-09 DIAGNOSIS — F31.81 BIPOLAR 2 DISORDER (MULTI): ICD-10-CM

## 2024-11-09 DIAGNOSIS — F41.9 ANXIETY: ICD-10-CM

## 2024-11-09 PROCEDURE — 90837 PSYTX W PT 60 MINUTES: CPT | Performed by: PSYCHOLOGIST

## 2024-11-11 NOTE — PROGRESS NOTES
1PM 26234 Indiv Psych for BP II, Depressed and Anxiety (60 MIN, 105-201)  In-person. Supportive/CBT.  Patient has been anxious.  A lot has been going on.  Patient quit her job earlier this week.  She felt like she was being set up.  She reported that she was out of the office earlier in the week and that the state investigators came and in relation to a dietary complaint.  No one informed her of this.  She heard to the [place] that corporate administration was upset with her.  She was uncertain why no one got back to her to let her know to come in because of the state investigator.  Decided with that and the difficulty at the 1 location where she was it was best to just resigned.  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  Plans on upon applying for several jobs later today.  Patient has elected to continue with her [employer] class in [place].  Will be going more consistently  Despite the fact that  it is coed.  Has decided to get unlimited membership for the next month.  Patient indicated that she believes she has some borderline tendencies.  Patient did the craft for her with her mother and that went well.  She also had a good birthday party at her apartment.  Her sister recently moved to [place] and she plans on seeing her more consistently.  She continues to get the Spravato treatment 1 time per week.  She has a lot of Buddhism activities that will keep her busy.  Continues to use cannabis daily for anxiety.  Has not seen her nurse practitioner for psychiatric meds in a while and will schedule follow-up at some point.  There is decided did not prioritize dating in the near future.  Some avoidant tendencies and not wanting to get out. Next: 2 weeks.

## 2024-11-22 ENCOUNTER — APPOINTMENT (OUTPATIENT)
Dept: BEHAVIORAL HEALTH | Facility: CLINIC | Age: 29
End: 2024-11-22
Payer: COMMERCIAL

## 2024-12-13 ENCOUNTER — APPOINTMENT (OUTPATIENT)
Dept: BEHAVIORAL HEALTH | Facility: CLINIC | Age: 29
End: 2024-12-13
Payer: COMMERCIAL

## 2024-12-14 ENCOUNTER — OFFICE VISIT (OUTPATIENT)
Dept: BEHAVIORAL HEALTH | Facility: CLINIC | Age: 29
End: 2024-12-14
Payer: COMMERCIAL

## 2024-12-14 DIAGNOSIS — F31.81 BIPOLAR 2 DISORDER (MULTI): ICD-10-CM

## 2024-12-14 DIAGNOSIS — F41.9 ANXIETY: ICD-10-CM

## 2024-12-14 PROCEDURE — 90837 PSYTX W PT 60 MINUTES: CPT | Performed by: PSYCHOLOGIST
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
## 2025-01-11 ENCOUNTER — APPOINTMENT (OUTPATIENT)
Dept: BEHAVIORAL HEALTH | Facility: CLINIC | Age: 30
End: 2025-01-11
Payer: COMMERCIAL

## 2025-01-11 DIAGNOSIS — F41.9 ANXIETY: ICD-10-CM

## 2025-01-11 DIAGNOSIS — F31.81 BIPOLAR 2 DISORDER (MULTI): ICD-10-CM

## 2025-01-11 PROCEDURE — 90834 PSYTX W PT 45 MINUTES: CPT | Performed by: PSYCHOLOGIST

## 2025-01-11 NOTE — PROGRESS NOTES
9 AM 60206 Ind Psych for BP II, Depressed and Anxiety (45 MIN, 911-1100)  In-person. Supportive/CBT.  Discussed some of her goals for this coming year.  Patient continues to report some anxiety, more than depression.  Up-and-down mood over the past month.  Continues with weekly Spravato treatment.  Had some issues and she found that she had had to be on Cobra health coverage insurance following her leaving her other employer and her new health coverage insurance will kick in February 19.  Father has been helping her with all this.  Taking her car in today to get a new lease.  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  Meeting some new friends.  Discussed some of her goals for this year.  She would like to get her weight stripes on her belt for [employer].  Wants to be able to do 1 regular push-up.  Wants to read 25 bucks.  Wants to maintain it.  At least 30 days of sobriety away from cannabis.  Wants to improve on her skin picking and hair pulling.  Wants to get engaged in a treatment program at the [facility] or at least be evaluated.  She noted that her appetite has been down and she has to remind herself to eat.  Using Klonopin sparingly.  Her brother will be visiting tomorrow from [place].  They will be moving here this July.  Must maintain more consistent contact with brother.  Discussed some difficulties she has with anxiety when she travels. Next: 2 weeks.

## 2025-01-25 ENCOUNTER — APPOINTMENT (OUTPATIENT)
Dept: BEHAVIORAL HEALTH | Facility: CLINIC | Age: 30
End: 2025-01-25
Payer: COMMERCIAL

## 2025-01-25 DIAGNOSIS — F31.81: ICD-10-CM

## 2025-01-25 DIAGNOSIS — F41.9 ANXIETY: ICD-10-CM

## 2025-01-25 PROCEDURE — 90837 PSYTX W PT 60 MINUTES: CPT | Performed by: PSYCHOLOGIST

## 2025-01-25 NOTE — PROGRESS NOTES
9 AM 32345 [facility] Psych for BP II, Depressed and Anxiety (45 MIN, [phone])  In-person. Supportive/CBT. Patient reported that the week before last was extremely difficult.  Has been feeling a teeny bit better this week.  Patient reported moderate levels of depressive symptoms despite the routine treatment with the Spravato.  It is becoming more apparent that the ketamine treatment is not being effective in managing her depressive symptoms.  Will continue weekly for now.  Encouraged her to discuss the possibility of titrating off of that with her provider.  Patient saw her pharmacologist recently and switched from risperidone to Abilify but only took it for 4 days and discontinued because of side effects.  She reported that she felt like she was overheating and nauseous so discontinued and went back on the risperidone.  Also noted that she was more sedated on the Abilify as well as having muscle twitches.   she indicated that her pharmacologist said that he had no more ideas as far as thing to do to improve.  She will have a consultation with the [facility] over the next few months and they will do a thorough review of her record.  As a result of not feeling so well did not attend [facility] this past Monday.  Plans to get back to that today.  She noted that she has been more avoidant has not been cleaning her kitchen in her place is messy.  She also reported that she feels like sleeping more.  also feeling anxious.  She made the decision to discontinue cannabis use but went back to using it after only 1 day.  Encouraged her to consider a reasonable titration down plan.  Feeling overwhelmed with everything.  Still getting to work but left early 1 day.  Reported brain fog.  She engaged in self-harm a week and a half ago by cutting herself with a craft knife.  We agreed she would give this to her sister.  She and her sister discussed a plan to help with the skin picking and she wants to prioritize that over the next few weeks.   She thinks that getting some facial patches may help with this.  She has not been eating routinely and is finally going to order groceries from the store.  Also will try to challenge yourself to do wonder if to tasks around her home.  Wants to continue to get to kirsten\[facility] [place] [facility]\"" 3 times per week.  Reported that as a result of the skin picking she will give her tweezers to her sister.  She has a trip planned to see her grandmother and parents in [place] in late February and is also planning on going to [place] in April for a baby shower. Nest: 2 weeks.

## 2025-02-08 ENCOUNTER — APPOINTMENT (OUTPATIENT)
Dept: BEHAVIORAL HEALTH | Facility: CLINIC | Age: 30
End: 2025-02-08
Payer: COMMERCIAL

## 2025-02-08 DIAGNOSIS — F31.81 BIPOLAR II DISORDER, CURRENT EPISODE DEPRESSED (MULTI): ICD-10-CM

## 2025-02-08 DIAGNOSIS — F41.9 ANXIETY: ICD-10-CM

## 2025-02-08 PROCEDURE — 90837 PSYTX W PT 60 MINUTES: CPT | Performed by: PSYCHOLOGIST

## 2025-02-08 NOTE — PROGRESS NOTES
Dictated with Dragon One medical software  9 AM 40496 Indiv Psych for BP II, Depressed and Anxiety (60 MIN, [phone])  In-person. Supportive/CBT.  Patient is planning on quitting her job over the next few weeks.  Is finding it too stressful and has concerns about the ministration as well as potential lawsuits.  Patient had almost gotten 90 days.   is looking at other job opportunities at this point.  Continues to get Spravato treatments 1 time per week.  She is feeling exhausted and still having some avoidant patterns. 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  was able to go out and run yesterday and hopes to today.  Her skin picking has gotten worse.  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  Plans to go to [place] in a few weeks for a week vacation and will stay at her grandmother's.  Struggling with keeping her apartment picked up. Next: 2 weeks.

## 2025-02-22 ENCOUNTER — APPOINTMENT (OUTPATIENT)
Dept: BEHAVIORAL HEALTH | Facility: CLINIC | Age: 30
End: 2025-02-22
Payer: COMMERCIAL

## 2025-02-22 DIAGNOSIS — F31.81 BIPOLAR 2 DISORDER (MULTI): ICD-10-CM

## 2025-02-22 DIAGNOSIS — F41.9 ANXIETY: ICD-10-CM

## 2025-02-22 PROCEDURE — 90834 PSYTX W PT 45 MINUTES: CPT | Performed by: PSYCHOLOGIST

## 2025-02-23 NOTE — PROGRESS NOTES
"Dictated with Dragon One medical software  9 AM 80076 Indiv Psych for BP II, Depressed and Anxiety (45 MIN, [phone])  Virtual visit. Supportive/CBT.   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  She asked for additional help in the opportunity to work from home.  He immediately hired a dietitian and which would be of some help and also indicated that she could work from home 2 days/week.  As a result she has elected to stay for now.  She continues to get Spravato treatment weekly.  We discussed that in the long-term this does not seem to be have an sufficient impact to help maintain stable mood.  She had a discussion with her uncle, who is a psychiatrist, about treatment options including ECT.  She said that he does not think this will be a useful treatment option for her.  Patient also indicated that she is becoming more accustomed to the idea that she has borderline personality disorder.  She is continuing to research this and sees this is more of a problem as opposed to having bipolar 2 disorder.  We discussed her considering support groups as well as skills based DBT groups.  There are some of these in the area.  Also discussed the importance of her checking into avoidant behavior.  Patient is quitting the [employer] studio in [place] and will just go to the [facility].  She is currently using cannabis edibles and we discussed the potential usefulness of her getting off the cannabis altogether.  Patient has been to [facility] a few times to help her to continue this as well. Next: 2 weeks.  "

## 2025-02-24 ENCOUNTER — TELEPHONE (OUTPATIENT)
Dept: OTHER | Age: 30
End: 2025-02-24
Payer: COMMERCIAL

## 2025-03-08 ENCOUNTER — APPOINTMENT (OUTPATIENT)
Dept: BEHAVIORAL HEALTH | Facility: CLINIC | Age: 30
End: 2025-03-08
Payer: COMMERCIAL

## 2025-03-08 DIAGNOSIS — F41.9 ANXIETY: ICD-10-CM

## 2025-03-08 DIAGNOSIS — F31.81 BIPOLAR 2 DISORDER (MULTI): ICD-10-CM

## 2025-03-08 PROCEDURE — 90837 PSYTX W PT 60 MINUTES: CPT | Performed by: PSYCHOLOGIST

## 2025-03-08 NOTE — PROGRESS NOTES
Dictated with Dragon One medical software  3 PM 9523 Indiv Psych for BP II, Depressed and Anxiety (60 MIN, 306-400)  In-person. Supportive/CBT. Patient went to [place] for a week of vacation at grandmother's. reported she felt sad and depressed there and had a meltdown towards the end.  Also had some suicidal ideation.  Completed paperwork for her for the treatment resistant depression program evaluation [facility] the [facility].  She reported that someone she went out on a date with last year to Sikhism match reached out and they are planning on going out this coming week. she realizes she is more emotionally vulnerable when dating someone.  Rico is a  and a strong Sikhism.  Indicated that she was not attracted to him last year but is willing to give him a second chance.   Patient has been reading more about BPD and splitting.  Has been doing some body scans involving breathing.  Still getting Spravato treatment 1 time per week.  Has lost 8 pounds since being off the risperidone.  Pushing self to eat 1 time per day.  Reported that the skin picking is a little bit better as she is using skin products and gave her sister her tweezers.  Also plans to make an appointment with an .  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  Work has been going better and she had a talk with her  about his behavior. Next: 2 weeks.

## 2025-03-21 ENCOUNTER — APPOINTMENT (OUTPATIENT)
Dept: BEHAVIORAL HEALTH | Facility: CLINIC | Age: 30
End: 2025-03-21
Payer: COMMERCIAL

## 2025-03-21 DIAGNOSIS — F41.9 ANXIETY: ICD-10-CM

## 2025-03-21 DIAGNOSIS — F31.81 BIPOLAR 2 DISORDER (MULTI): ICD-10-CM

## 2025-03-21 PROCEDURE — 90837 PSYTX W PT 60 MINUTES: CPT | Performed by: PSYCHOLOGIST

## 2025-03-22 NOTE — PROGRESS NOTES
Dictated with Dragon Medical One software  5 PM 93278 Indiv Psych for BP II, Depressed and Anxiety (60 MIN, 503-703)  In-person. Supportive/CBT. Patient has been depressed and anxious.   She did not have her Spravato treatment today because the medication did not come in.  Spent most the time today discussing her recent relationship.  Patient went out on a day with a man that she had met a year ago.  Had a nice time.  However, is having a hard time not overthinking the relationship.  They have had some consistent contact via texting but she worries about whether or not he likes her.  They were scheduled to get together on Tuesday but he did not follow through but later called and apologized.  They are scheduled to go to mass together this Sunday.  Patient is having a hard time not thinking about the relationship and is getting quite distressed.  Encouraged her to continue to maintain other contacts and do usual activities and not just focus on the relationship.  Patient has had a hard time eating.  Continues to use cannabis daily.  Is scheduled for [facility] mental health assessment on April 15.  Trying to reduce reliance on Klonopin.  Work has been going okay.  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   Picked up a few books on BPD. Next: 2 weeks.

## 2025-03-28 ENCOUNTER — TELEMEDICINE (OUTPATIENT)
Dept: BEHAVIORAL HEALTH | Facility: CLINIC | Age: 30
End: 2025-03-28
Payer: COMMERCIAL

## 2025-03-28 DIAGNOSIS — F41.9 ANXIETY: ICD-10-CM

## 2025-03-28 DIAGNOSIS — F31.81 BIPOLAR II DISORDER, CURRENT EPISODE DEPRESSED (MULTI): ICD-10-CM

## 2025-03-28 PROCEDURE — 90834 PSYTX W PT 45 MINUTES: CPT | Performed by: PSYCHOLOGIST

## 2025-03-29 NOTE — PROGRESS NOTES
"Dictated with Dragon Medical One software  6 PM 88133 Indiv Psych for BP II, Depressed and Anxiety (60 MIN, [phone])  Virtual visit. Supportive/CBT. Patient has been depressed and anxious.  Not doing well.  She got her Spravato treatment today.  Spent much of the time discussing the distress related to her feelings regarding her current dating relationship.  Has left work early 3 times, having multiple panic attacks.  She cut herself yesterday and is wanted to hurt herself more consistently.  \"I cannot take another rejection text.\"  She went to [employer] and out for coffee with a manage she has been dating last Sunday.  They have text throughout the week.  \"I cannot stop thinking about it.\"  Will fantasizes about the relationship but then also gets needy and desperate when she does not hear from him.  \"Waiting for the final below.\"  He indicated that he might be able to talk to her this evening but she is bracing her self that he will be done with her.  Patient did attend a depression support group in [place] and plans to do this routinely.  Others including assisting her telling her that she has no evidence that he will end the relationship but she is still fearful and preoccupied with this.  Feeling down.  Continues to use cannabis daily.  Has continued to use her Klonopin but reported it provides no relief 80% of the time.  Sees her psychiatric pharmacologist on April 1 and has a [facility] evaluation on April 14.  She reported her pH Q score was 25 out of 27 indicating significant depression.  This evening she is going to try to go to sleep early.  Has plans to go watch during a [facility] competition tomorrow to see her friends.  Plans to get to [employer] on Sunday.  Is reading the book about BPD, I hate you do not leave me.  A lot of this resonates her. Next: 2 weeks.  "

## 2025-04-05 ENCOUNTER — APPOINTMENT (OUTPATIENT)
Dept: BEHAVIORAL HEALTH | Facility: CLINIC | Age: 30
End: 2025-04-05
Payer: COMMERCIAL

## 2025-04-12 ENCOUNTER — APPOINTMENT (OUTPATIENT)
Dept: BEHAVIORAL HEALTH | Facility: CLINIC | Age: 30
End: 2025-04-12
Payer: COMMERCIAL

## 2025-04-19 ENCOUNTER — APPOINTMENT (OUTPATIENT)
Dept: BEHAVIORAL HEALTH | Facility: CLINIC | Age: 30
End: 2025-04-19
Payer: COMMERCIAL

## 2025-04-19 DIAGNOSIS — F31.81 BIPOLAR 2 DISORDER (MULTI): ICD-10-CM

## 2025-04-19 DIAGNOSIS — F41.9 ANXIETY: ICD-10-CM

## 2025-04-19 PROCEDURE — 90837 PSYTX W PT 60 MINUTES: CPT | Performed by: PSYCHOLOGIST

## 2025-04-19 NOTE — PROGRESS NOTES
Dictated with Dragon Medical One software  3 PM [address] [facility] Psych for BP II, Depressed and Anxiety (60 MIN, [phone])  In-person visit. Supportive/CBT.  Patient last seen prior to her hospitalization.  Following our last visit went to the ER at [facility] and then transferred to [facility] there Monday through Saturday.  She was not given clonazepam while there also reduced her lamotrigine and duloxetine.  Was also offered Vistaril and gabapentin.  She was in withdrawal several days but reported it was a much-needed experience.  Participated primarily in group treatments.  Also went at the consultation at the [facility] and met with the psychiatrist for a few hours.  Given options for future treatment that included starting an MAOI, ECT, IV ketamine use.  They discussed diagnosis including bipolar 2 disorder, MDD, GUY, OCD and PTSD.  Also provider used the term referred to his tardive dysphoria.  Patient has elected to enroll in the month-long trial of IV ketamine versus ECT for acute suicidal ideation.  Study will last a month and she will be randomly assigned.  That will start next week.  She is also starting a DVT skills-based program the [facility] in [place], [place].  Treatment will be individual and group and is primarily self-pay and her parents will help her financially.  Patient and I discussed her relationship and she continue to have some contact with the  that she met.  Has had no contact in the recent past.  Feeling okay about the relationship ending.  Hurt her leg in [facility] and has a wrap on it currently.  Plans to get back to [facility] soon.  She will be taking a break from our treatment.  Will keep me posted on it. Next: to call.